# Patient Record
Sex: MALE | Race: BLACK OR AFRICAN AMERICAN | NOT HISPANIC OR LATINO
[De-identification: names, ages, dates, MRNs, and addresses within clinical notes are randomized per-mention and may not be internally consistent; named-entity substitution may affect disease eponyms.]

---

## 2017-03-24 ENCOUNTER — APPOINTMENT (OUTPATIENT)
Dept: INTERNAL MEDICINE | Facility: CLINIC | Age: 32
End: 2017-03-24

## 2017-03-24 ENCOUNTER — NON-APPOINTMENT (OUTPATIENT)
Age: 32
End: 2017-03-24

## 2017-03-24 ENCOUNTER — LABORATORY RESULT (OUTPATIENT)
Age: 32
End: 2017-03-24

## 2017-03-24 VITALS
WEIGHT: 132 LBS | SYSTOLIC BLOOD PRESSURE: 134 MMHG | RESPIRATION RATE: 12 BRPM | DIASTOLIC BLOOD PRESSURE: 92 MMHG | HEIGHT: 67 IN | HEART RATE: 76 BPM | BODY MASS INDEX: 20.72 KG/M2

## 2017-03-30 LAB
ALBUMIN MFR SERPL ELPH: 50.2 %
ALBUMIN SERPL ELPH-MCNC: 4.1 G/DL
ALBUMIN SERPL-MCNC: 4 G/DL
ALBUMIN/GLOB SERPL: 1 RATIO
ALP BLD-CCNC: 139 U/L
ALPHA1 GLOB MFR SERPL ELPH: 3.5 %
ALPHA1 GLOB SERPL ELPH-MCNC: 0.3 G/DL
ALPHA2 GLOB MFR SERPL ELPH: 6.2 %
ALPHA2 GLOB SERPL ELPH-MCNC: 0.5 G/DL
ALT SERPL-CCNC: 36 U/L
ANION GAP SERPL CALC-SCNC: 14 MMOL/L
APPEARANCE: CLEAR
AST SERPL-CCNC: 46 U/L
B-GLOBULIN MFR SERPL ELPH: 12.4 %
B-GLOBULIN SERPL ELPH-MCNC: 1 G/DL
BACTERIA: NEGATIVE
BASOPHILS # BLD AUTO: 0 K/UL
BASOPHILS NFR BLD AUTO: 0 %
BILIRUB SERPL-MCNC: 3.4 MG/DL
BILIRUBIN URINE: NEGATIVE
BLOOD URINE: NEGATIVE
BUN SERPL-MCNC: 6 MG/DL
CALCIUM SERPL-MCNC: 10 MG/DL
CHLORIDE SERPL-SCNC: 104 MMOL/L
CHOLEST SERPL-MCNC: 110 MG/DL
CHOLEST/HDLC SERPL: 4.8 RATIO
CO2 SERPL-SCNC: 22 MMOL/L
COLOR: ABNORMAL
CREAT SERPL-MCNC: 0.71 MG/DL
DEPRECATED KAPPA LC FREE/LAMBDA SER: 1.27 RATIO
EOSINOPHIL # BLD AUTO: 0 K/UL
EOSINOPHIL NFR BLD AUTO: 0 %
GAMMA GLOB FLD ELPH-MCNC: 2.2 G/DL
GAMMA GLOB MFR SERPL ELPH: 27.7 %
GLUCOSE QUALITATIVE U: NORMAL MG/DL
GLUCOSE SERPL-MCNC: 92 MG/DL
HCT VFR BLD CALC: 27.8 %
HDLC SERPL-MCNC: 23 MG/DL
HGB BLD-MCNC: 9.2 G/DL
HYALINE CASTS: 0 /LPF
IGA SER QL IEP: 365 MG/DL
IGG SER QL IEP: 2300 MG/DL
IGM SER QL IEP: 55 MG/DL
INTERPRETATION SERPL IEP-IMP: NORMAL
KAPPA LC CSF-MCNC: 2.48 MG/DL
KAPPA LC SERPL-MCNC: 3.14 MG/DL
KETONES URINE: NEGATIVE
LDLC SERPL CALC-MCNC: 62 MG/DL
LEUKOCYTE ESTERASE URINE: NEGATIVE
LYMPHOCYTES # BLD AUTO: 2.35 K/UL
LYMPHOCYTES NFR BLD AUTO: 29 %
M PROTEIN SPEC IFE-MCNC: NORMAL
MAN DIFF?: NORMAL
MCHC RBC-ENTMCNC: 25.5 PG
MCHC RBC-ENTMCNC: 33.1 GM/DL
MCV RBC AUTO: 77 FL
MICROSCOPIC-UA: NORMAL
MONOCYTES # BLD AUTO: 1.54 K/UL
MONOCYTES NFR BLD AUTO: 19 %
NEUTROPHILS # BLD AUTO: 4.14 K/UL
NEUTROPHILS NFR BLD AUTO: 51 %
NITRITE URINE: NEGATIVE
PH URINE: 6
PLATELET # BLD AUTO: 345 K/UL
POTASSIUM SERPL-SCNC: 4.6 MMOL/L
PROT SERPL-MCNC: 7.9 G/DL
PROTEIN URINE: NEGATIVE MG/DL
RBC # BLD: 3.61 M/UL
RBC # FLD: 23.5 %
RED BLOOD CELLS URINE: 2 /HPF
SODIUM SERPL-SCNC: 140 MMOL/L
SPECIFIC GRAVITY URINE: 1.01
SQUAMOUS EPITHELIAL CELLS: 0 /HPF
TRIGL SERPL-MCNC: 123 MG/DL
UROBILINOGEN URINE: 1 MG/DL
WBC # FLD AUTO: 8.11 K/UL
WHITE BLOOD CELLS URINE: 0 /HPF

## 2017-03-31 ENCOUNTER — RESULT REVIEW (OUTPATIENT)
Age: 32
End: 2017-03-31

## 2018-03-30 ENCOUNTER — NON-APPOINTMENT (OUTPATIENT)
Age: 33
End: 2018-03-30

## 2018-03-30 ENCOUNTER — APPOINTMENT (OUTPATIENT)
Dept: INTERNAL MEDICINE | Facility: CLINIC | Age: 33
End: 2018-03-30
Payer: MEDICAID

## 2018-03-30 ENCOUNTER — LABORATORY RESULT (OUTPATIENT)
Age: 33
End: 2018-03-30

## 2018-03-30 VITALS
RESPIRATION RATE: 13 BRPM | HEIGHT: 67 IN | DIASTOLIC BLOOD PRESSURE: 70 MMHG | HEART RATE: 80 BPM | BODY MASS INDEX: 20.72 KG/M2 | WEIGHT: 132 LBS | SYSTOLIC BLOOD PRESSURE: 120 MMHG

## 2018-03-30 DIAGNOSIS — Z00.00 ENCOUNTER FOR GENERAL ADULT MEDICAL EXAMINATION W/OUT ABNORMAL FINDINGS: ICD-10-CM

## 2018-03-30 DIAGNOSIS — R74.8 ABNORMAL LEVELS OF OTHER SERUM ENZYMES: ICD-10-CM

## 2018-03-30 DIAGNOSIS — D89.0 POLYCLONAL HYPERGAMMAGLOBULINEMIA: ICD-10-CM

## 2018-03-30 PROCEDURE — 86580 TB INTRADERMAL TEST: CPT

## 2018-03-30 PROCEDURE — 36415 COLL VENOUS BLD VENIPUNCTURE: CPT

## 2018-03-30 PROCEDURE — 93000 ELECTROCARDIOGRAM COMPLETE: CPT

## 2018-03-30 PROCEDURE — 99395 PREV VISIT EST AGE 18-39: CPT | Mod: 25

## 2018-04-01 LAB
ALBUMIN SERPL ELPH-MCNC: 4.3 G/DL
ALP BLD-CCNC: 171 U/L
ALT SERPL-CCNC: 45 U/L
ANION GAP SERPL CALC-SCNC: 14 MMOL/L
APPEARANCE: CLEAR
AST SERPL-CCNC: 79 U/L
BACTERIA: NEGATIVE
BASOPHILS # BLD AUTO: 0.08 K/UL
BASOPHILS NFR BLD AUTO: 0.4 %
BILIRUB SERPL-MCNC: 5.3 MG/DL
BILIRUBIN URINE: ABNORMAL
BLOOD URINE: NEGATIVE
BUN SERPL-MCNC: 10 MG/DL
CALCIUM SERPL-MCNC: 11 MG/DL
CHLORIDE SERPL-SCNC: 104 MMOL/L
CHOLEST SERPL-MCNC: 112 MG/DL
CHOLEST/HDLC SERPL: 4.3 RATIO
CO2 SERPL-SCNC: 23 MMOL/L
COLOR: ABNORMAL
CREAT SERPL-MCNC: 0.84 MG/DL
EOSINOPHIL # BLD AUTO: 0.18 K/UL
EOSINOPHIL NFR BLD AUTO: 0.9 %
GLUCOSE QUALITATIVE U: NEGATIVE MG/DL
GLUCOSE SERPL-MCNC: 93 MG/DL
HCT VFR BLD CALC: 28.7 %
HCV AB SER QL: NONREACTIVE
HCV S/CO RATIO: 0.13 S/CO
HDLC SERPL-MCNC: 26 MG/DL
HGB BLD-MCNC: 9.7 G/DL
HIV1+2 AB SPEC QL IA.RAPID: NONREACTIVE
HYALINE CASTS: 2 /LPF
IMM GRANULOCYTES NFR BLD AUTO: 0.3 %
KETONES URINE: NEGATIVE
LDLC SERPL CALC-MCNC: 60 MG/DL
LEUKOCYTE ESTERASE URINE: NEGATIVE
LYMPHOCYTES # BLD AUTO: 3.86 K/UL
LYMPHOCYTES NFR BLD AUTO: 20.3 %
MAN DIFF?: NORMAL
MCHC RBC-ENTMCNC: 27.9 PG
MCHC RBC-ENTMCNC: 33.8 GM/DL
MCV RBC AUTO: 82.5 FL
MICROSCOPIC-UA: NORMAL
MONOCYTES # BLD AUTO: 2.51 K/UL
MONOCYTES NFR BLD AUTO: 13.2 %
NEUTROPHILS # BLD AUTO: 12.3 K/UL
NEUTROPHILS NFR BLD AUTO: 64.9 %
NITRITE URINE: NEGATIVE
PH URINE: 6
PLATELET # BLD AUTO: 316 K/UL
POTASSIUM SERPL-SCNC: 4.9 MMOL/L
PROT SERPL-MCNC: 8.6 G/DL
PROTEIN URINE: NEGATIVE MG/DL
RBC # BLD: 3.48 M/UL
RBC # FLD: 25.7 %
RED BLOOD CELLS URINE: 8 /HPF
SODIUM SERPL-SCNC: 141 MMOL/L
SPECIFIC GRAVITY URINE: 1.01
SQUAMOUS EPITHELIAL CELLS: 0 /HPF
TRIGL SERPL-MCNC: 129 MG/DL
UROBILINOGEN URINE: 1 MG/DL
WBC # FLD AUTO: 18.99 K/UL
WHITE BLOOD CELLS URINE: 1 /HPF

## 2018-04-02 ENCOUNTER — RESULT REVIEW (OUTPATIENT)
Age: 33
End: 2018-04-02

## 2019-03-15 ENCOUNTER — APPOINTMENT (OUTPATIENT)
Dept: INTERNAL MEDICINE | Facility: CLINIC | Age: 34
End: 2019-03-15
Payer: MEDICAID

## 2019-03-15 VITALS — HEART RATE: 92 BPM | HEIGHT: 67 IN | BODY MASS INDEX: 20.4 KG/M2 | RESPIRATION RATE: 12 BRPM | WEIGHT: 130 LBS

## 2019-03-15 VITALS
WEIGHT: 130 LBS | SYSTOLIC BLOOD PRESSURE: 120 MMHG | TEMPERATURE: 98 F | DIASTOLIC BLOOD PRESSURE: 75 MMHG | HEART RATE: 92 BPM | BODY MASS INDEX: 20.36 KG/M2

## 2019-03-15 DIAGNOSIS — Z87.898 PERSONAL HISTORY OF OTHER SPECIFIED CONDITIONS: ICD-10-CM

## 2019-03-15 DIAGNOSIS — Z11.4 ENCOUNTER FOR SCREENING FOR HUMAN IMMUNODEFICIENCY VIRUS [HIV]: ICD-10-CM

## 2019-03-15 DIAGNOSIS — Z11.1 ENCOUNTER FOR SCREENING FOR RESPIRATORY TUBERCULOSIS: ICD-10-CM

## 2019-03-15 DIAGNOSIS — N62 HYPERTROPHY OF BREAST: ICD-10-CM

## 2019-03-15 DIAGNOSIS — Z11.59 ENCOUNTER FOR SCREENING FOR OTHER VIRAL DISEASES: ICD-10-CM

## 2019-03-15 PROCEDURE — 99213 OFFICE O/P EST LOW 20 MIN: CPT

## 2019-03-15 NOTE — ADDENDUM
[FreeTextEntry1] :  mammogram/sonogram per verbal consistent with gynecomastia\par \par PLAN\par -endocrine eval for etiology, pt aware

## 2019-03-15 NOTE — ASSESSMENT
[FreeTextEntry1] : Diagnostic LEFT mammogram/sonogram ordered and further management will be made after images. Etiologies includes possible small abscess, gynecomastia or tumor. Discussed with patient and grandmother. Patient will return to the office p.r.n./CPE\par \par \par Dr. Ulrich Was present in office building while I was examining patient

## 2019-03-15 NOTE — PHYSICAL EXAM
[No Acute Distress] : no acute distress [No Respiratory Distress] : no respiratory distress  [Clear to Auscultation] : lungs were clear to auscultation bilaterally [Normal Rate] : normal rate  [Regular Rhythm] : with a regular rhythm [Normal Affect] : the affect was normal [Normal Mood] : the mood was normal [de-identified] : +Gum ball sized palpable LEFT breast mass behind the nipple. Area is sore but no erythema or warmth. Area is slightly indurated and hard but mobile. No nipple discharge or skin retraction. No axillary lymphadenopathy

## 2019-03-15 NOTE — HISTORY OF PRESENT ILLNESS
[FreeTextEntry8] : Patient presents stating he noticed what he thought was a pimple to his left breast area about one week ago. Patient states he did not pick or poke at it and seems as though the area is getting bigger in size. Patient states it is sore to touch it and hurts even if he puts on a shirt. Patient has noticed no skin retraction or nipple discharge. Patient has had no fever

## 2019-04-03 ENCOUNTER — APPOINTMENT (OUTPATIENT)
Dept: INTERNAL MEDICINE | Facility: CLINIC | Age: 34
End: 2019-04-03

## 2019-04-30 ENCOUNTER — INPATIENT (INPATIENT)
Facility: HOSPITAL | Age: 34
LOS: 5 days | Discharge: HOME | DRG: 811 | End: 2019-05-06
Attending: INTERNAL MEDICINE | Admitting: INTERNAL MEDICINE
Payer: COMMERCIAL

## 2019-04-30 VITALS
OXYGEN SATURATION: 96 % | SYSTOLIC BLOOD PRESSURE: 137 MMHG | RESPIRATION RATE: 22 BRPM | HEART RATE: 130 BPM | DIASTOLIC BLOOD PRESSURE: 85 MMHG | WEIGHT: 130.07 LBS | HEIGHT: 67 IN | TEMPERATURE: 99 F

## 2019-04-30 DIAGNOSIS — A41.9 SEPSIS, UNSPECIFIED ORGANISM: ICD-10-CM

## 2019-04-30 DIAGNOSIS — D57.01 HB-SS DISEASE WITH ACUTE CHEST SYNDROME: ICD-10-CM

## 2019-04-30 DIAGNOSIS — J18.1 LOBAR PNEUMONIA, UNSPECIFIED ORGANISM: ICD-10-CM

## 2019-04-30 LAB
ALBUMIN SERPL ELPH-MCNC: 3.9 G/DL — SIGNIFICANT CHANGE UP (ref 3.3–5.2)
ALP SERPL-CCNC: 187 U/L — HIGH (ref 40–120)
ALT FLD-CCNC: 35 U/L — SIGNIFICANT CHANGE UP
ANION GAP SERPL CALC-SCNC: 12 MMOL/L — SIGNIFICANT CHANGE UP (ref 5–17)
APPEARANCE UR: CLEAR — SIGNIFICANT CHANGE UP
APTT BLD: 30.5 SEC — SIGNIFICANT CHANGE UP (ref 27.5–36.3)
AST SERPL-CCNC: 53 U/L — HIGH
BACTERIA # UR AUTO: ABNORMAL
BASE EXCESS BLDV CALC-SCNC: 0.6 MMOL/L — SIGNIFICANT CHANGE UP (ref -2–2)
BASOPHILS NFR BLD AUTO: 1 % — SIGNIFICANT CHANGE UP (ref 0–2)
BILIRUB SERPL-MCNC: 10.2 MG/DL — HIGH (ref 0.4–2)
BILIRUB UR-MCNC: ABNORMAL
BUN SERPL-MCNC: 5 MG/DL — LOW (ref 8–20)
CA-I SERPL-SCNC: 1.33 MMOL/L — SIGNIFICANT CHANGE UP (ref 1.15–1.33)
CALCIUM SERPL-MCNC: 10.3 MG/DL — HIGH (ref 8.6–10.2)
CHLORIDE BLDV-SCNC: 104 MMOL/L — SIGNIFICANT CHANGE UP (ref 98–107)
CHLORIDE SERPL-SCNC: 100 MMOL/L — SIGNIFICANT CHANGE UP (ref 98–107)
CO2 SERPL-SCNC: 22 MMOL/L — SIGNIFICANT CHANGE UP (ref 22–29)
COLOR SPEC: ABNORMAL
CREAT SERPL-MCNC: 0.38 MG/DL — LOW (ref 0.5–1.3)
DIFF PNL FLD: ABNORMAL
EPI CELLS # UR: SIGNIFICANT CHANGE UP
GAS PNL BLDV: 138 MMOL/L — SIGNIFICANT CHANGE UP (ref 135–145)
GAS PNL BLDV: SIGNIFICANT CHANGE UP
GAS PNL BLDV: SIGNIFICANT CHANGE UP
GLUCOSE BLDV-MCNC: 104 MG/DL — HIGH (ref 70–99)
GLUCOSE SERPL-MCNC: 104 MG/DL — SIGNIFICANT CHANGE UP (ref 70–115)
GLUCOSE UR QL: NEGATIVE MG/DL — SIGNIFICANT CHANGE UP
HCO3 BLDV-SCNC: 24 MMOL/L — SIGNIFICANT CHANGE UP (ref 20–26)
HCT VFR BLD CALC: 28.6 % — LOW (ref 42–52)
HCT VFR BLDA CALC: 31 — LOW (ref 39–50)
HGB BLD CALC-MCNC: 10.2 G/DL — LOW (ref 13–17)
HGB BLD-MCNC: 9.7 G/DL — LOW (ref 14–18)
INR BLD: 1.31 RATIO — HIGH (ref 0.88–1.16)
KETONES UR-MCNC: NEGATIVE — SIGNIFICANT CHANGE UP
LACTATE BLDV-MCNC: 1.3 MMOL/L — SIGNIFICANT CHANGE UP (ref 0.5–2)
LEUKOCYTE ESTERASE UR-ACNC: ABNORMAL
LYMPHOCYTES # BLD AUTO: 7 % — LOW (ref 20–55)
MCHC RBC-ENTMCNC: 26.7 PG — LOW (ref 27–31)
MCHC RBC-ENTMCNC: 33.9 G/DL — SIGNIFICANT CHANGE UP (ref 32–36)
MCV RBC AUTO: 78.8 FL — LOW (ref 80–94)
MONOCYTES NFR BLD AUTO: 16 % — HIGH (ref 3–10)
NEUTROPHILS NFR BLD AUTO: 75 % — HIGH (ref 37–73)
NITRITE UR-MCNC: POSITIVE
NT-PROBNP SERPL-SCNC: 84 PG/ML — SIGNIFICANT CHANGE UP (ref 0–300)
OTHER CELLS CSF MANUAL: 7 ML/DL — LOW (ref 18–22)
PCO2 BLDV: 50 MMHG — SIGNIFICANT CHANGE UP (ref 35–50)
PH BLDV: 7.34 — SIGNIFICANT CHANGE UP (ref 7.32–7.43)
PH UR: 7 — SIGNIFICANT CHANGE UP (ref 5–8)
PLATELET # BLD AUTO: 336 K/UL — SIGNIFICANT CHANGE UP (ref 150–400)
PO2 BLDV: 35 MMHG — SIGNIFICANT CHANGE UP (ref 25–45)
POTASSIUM BLDV-SCNC: 4.2 MMOL/L — SIGNIFICANT CHANGE UP (ref 3.4–4.5)
POTASSIUM SERPL-MCNC: 4.2 MMOL/L — SIGNIFICANT CHANGE UP (ref 3.5–5.3)
POTASSIUM SERPL-SCNC: 4.2 MMOL/L — SIGNIFICANT CHANGE UP (ref 3.5–5.3)
PROT SERPL-MCNC: 8.6 G/DL — SIGNIFICANT CHANGE UP (ref 6.6–8.7)
PROT UR-MCNC: 30 MG/DL
PROTHROM AB SERPL-ACNC: 15.2 SEC — HIGH (ref 10–12.9)
RAPID RVP RESULT: SIGNIFICANT CHANGE UP
RBC # BLD: 3.63 M/UL — LOW (ref 4.6–6.2)
RBC # FLD: 24.9 % — HIGH (ref 11–15.6)
RBC CASTS # UR COMP ASSIST: SIGNIFICANT CHANGE UP /HPF (ref 0–4)
SAO2 % BLDV: 54 % — SIGNIFICANT CHANGE UP
SODIUM SERPL-SCNC: 134 MMOL/L — LOW (ref 135–145)
SP GR SPEC: 1.01 — SIGNIFICANT CHANGE UP (ref 1.01–1.02)
TROPONIN T SERPL-MCNC: <0.01 NG/ML — SIGNIFICANT CHANGE UP (ref 0–0.06)
UROBILINOGEN FLD QL: 12 MG/DL
WBC # BLD: 26.8 K/UL — HIGH (ref 4.8–10.8)
WBC # FLD AUTO: 26.8 K/UL — HIGH (ref 4.8–10.8)
WBC UR QL: SIGNIFICANT CHANGE UP

## 2019-04-30 PROCEDURE — 99285 EMERGENCY DEPT VISIT HI MDM: CPT

## 2019-04-30 PROCEDURE — 71045 X-RAY EXAM CHEST 1 VIEW: CPT | Mod: 26

## 2019-04-30 PROCEDURE — 93010 ELECTROCARDIOGRAM REPORT: CPT

## 2019-04-30 PROCEDURE — 71275 CT ANGIOGRAPHY CHEST: CPT | Mod: 26

## 2019-04-30 RX ORDER — ACETAMINOPHEN WITH CODEINE 300MG-30MG
1 TABLET ORAL EVERY 4 HOURS
Qty: 0 | Refills: 0 | Status: DISCONTINUED | OUTPATIENT
Start: 2019-04-30 | End: 2019-05-06

## 2019-04-30 RX ORDER — AZITHROMYCIN 500 MG/1
500 TABLET, FILM COATED ORAL EVERY 24 HOURS
Qty: 0 | Refills: 0 | Status: DISCONTINUED | OUTPATIENT
Start: 2019-04-30 | End: 2019-05-03

## 2019-04-30 RX ORDER — CEFTRIAXONE 500 MG/1
1 INJECTION, POWDER, FOR SOLUTION INTRAMUSCULAR; INTRAVENOUS ONCE
Qty: 0 | Refills: 0 | Status: COMPLETED | OUTPATIENT
Start: 2019-04-30 | End: 2019-04-30

## 2019-04-30 RX ORDER — AZITHROMYCIN 500 MG/1
500 TABLET, FILM COATED ORAL ONCE
Qty: 0 | Refills: 0 | Status: COMPLETED | OUTPATIENT
Start: 2019-04-30 | End: 2019-04-30

## 2019-04-30 RX ORDER — ACETAMINOPHEN 500 MG
650 TABLET ORAL ONCE
Qty: 0 | Refills: 0 | Status: COMPLETED | OUTPATIENT
Start: 2019-04-30 | End: 2019-04-30

## 2019-04-30 RX ORDER — LIDOCAINE 4 G/100G
1 CREAM TOPICAL EVERY 24 HOURS
Qty: 0 | Refills: 0 | Status: DISCONTINUED | OUTPATIENT
Start: 2019-04-30 | End: 2019-05-06

## 2019-04-30 RX ORDER — CEFTRIAXONE 500 MG/1
1 INJECTION, POWDER, FOR SOLUTION INTRAMUSCULAR; INTRAVENOUS EVERY 24 HOURS
Qty: 0 | Refills: 0 | Status: DISCONTINUED | OUTPATIENT
Start: 2019-04-30 | End: 2019-05-03

## 2019-04-30 RX ORDER — SODIUM CHLORIDE 9 MG/ML
2000 INJECTION, SOLUTION INTRAVENOUS ONCE
Qty: 0 | Refills: 0 | Status: COMPLETED | OUTPATIENT
Start: 2019-04-30 | End: 2019-04-30

## 2019-04-30 RX ORDER — MORPHINE SULFATE 50 MG/1
4 CAPSULE, EXTENDED RELEASE ORAL ONCE
Qty: 0 | Refills: 0 | Status: DISCONTINUED | OUTPATIENT
Start: 2019-04-30 | End: 2019-04-30

## 2019-04-30 RX ORDER — ENOXAPARIN SODIUM 100 MG/ML
40 INJECTION SUBCUTANEOUS DAILY
Qty: 0 | Refills: 0 | Status: DISCONTINUED | OUTPATIENT
Start: 2019-04-30 | End: 2019-05-06

## 2019-04-30 RX ORDER — ACETAMINOPHEN 500 MG
650 TABLET ORAL EVERY 6 HOURS
Qty: 0 | Refills: 0 | Status: DISCONTINUED | OUTPATIENT
Start: 2019-04-30 | End: 2019-05-06

## 2019-04-30 RX ORDER — PIPERACILLIN AND TAZOBACTAM 4; .5 G/20ML; G/20ML
3.38 INJECTION, POWDER, LYOPHILIZED, FOR SOLUTION INTRAVENOUS ONCE
Qty: 0 | Refills: 0 | Status: COMPLETED | OUTPATIENT
Start: 2019-04-30 | End: 2019-04-30

## 2019-04-30 RX ORDER — MORPHINE SULFATE 50 MG/1
2 CAPSULE, EXTENDED RELEASE ORAL EVERY 6 HOURS
Qty: 0 | Refills: 0 | Status: DISCONTINUED | OUTPATIENT
Start: 2019-04-30 | End: 2019-05-06

## 2019-04-30 RX ORDER — FOLIC ACID 0.8 MG
0 TABLET ORAL
Qty: 0 | Refills: 0 | COMMUNITY

## 2019-04-30 RX ORDER — SODIUM CHLORIDE 9 MG/ML
1000 INJECTION, SOLUTION INTRAVENOUS
Qty: 0 | Refills: 0 | Status: DISCONTINUED | OUTPATIENT
Start: 2019-04-30 | End: 2019-05-01

## 2019-04-30 RX ORDER — VANCOMYCIN HCL 1 G
1000 VIAL (EA) INTRAVENOUS ONCE
Qty: 0 | Refills: 0 | Status: COMPLETED | OUTPATIENT
Start: 2019-04-30 | End: 2019-04-30

## 2019-04-30 RX ADMIN — SODIUM CHLORIDE 2000 MILLILITER(S): 9 INJECTION, SOLUTION INTRAVENOUS at 17:49

## 2019-04-30 RX ADMIN — LIDOCAINE 1 PATCH: 4 CREAM TOPICAL at 21:52

## 2019-04-30 RX ADMIN — SODIUM CHLORIDE 2000 MILLILITER(S): 9 INJECTION, SOLUTION INTRAVENOUS at 16:42

## 2019-04-30 RX ADMIN — PIPERACILLIN AND TAZOBACTAM 3.38 GRAM(S): 4; .5 INJECTION, POWDER, LYOPHILIZED, FOR SOLUTION INTRAVENOUS at 19:17

## 2019-04-30 RX ADMIN — AZITHROMYCIN 500 MILLIGRAM(S): 500 TABLET, FILM COATED ORAL at 16:45

## 2019-04-30 RX ADMIN — SODIUM CHLORIDE 125 MILLILITER(S): 9 INJECTION, SOLUTION INTRAVENOUS at 21:51

## 2019-04-30 RX ADMIN — MORPHINE SULFATE 4 MILLIGRAM(S): 50 CAPSULE, EXTENDED RELEASE ORAL at 16:49

## 2019-04-30 RX ADMIN — Medication 650 MILLIGRAM(S): at 18:17

## 2019-04-30 RX ADMIN — MORPHINE SULFATE 4 MILLIGRAM(S): 50 CAPSULE, EXTENDED RELEASE ORAL at 16:33

## 2019-04-30 RX ADMIN — Medication 250 MILLIGRAM(S): at 19:19

## 2019-04-30 RX ADMIN — Medication 1 TABLET(S): at 22:46

## 2019-04-30 RX ADMIN — CEFTRIAXONE 100 GRAM(S): 500 INJECTION, POWDER, FOR SOLUTION INTRAMUSCULAR; INTRAVENOUS at 16:45

## 2019-04-30 RX ADMIN — Medication 1 TABLET(S): at 21:51

## 2019-04-30 RX ADMIN — Medication 650 MILLIGRAM(S): at 16:44

## 2019-04-30 RX ADMIN — CEFTRIAXONE 1 GRAM(S): 500 INJECTION, POWDER, FOR SOLUTION INTRAMUSCULAR; INTRAVENOUS at 17:20

## 2019-04-30 RX ADMIN — PIPERACILLIN AND TAZOBACTAM 200 GRAM(S): 4; .5 INJECTION, POWDER, LYOPHILIZED, FOR SOLUTION INTRAVENOUS at 18:24

## 2019-04-30 NOTE — ED PROVIDER NOTE - CARE PLAN
Principal Discharge DX:	Sepsis, due to unspecified organism  Secondary Diagnosis:	Sickle cell crisis Principal Discharge DX:	Acute chest syndrome  Secondary Diagnosis:	Sickle cell crisis

## 2019-04-30 NOTE — ED ADULT TRIAGE NOTE - CHIEF COMPLAINT QUOTE
"I was sent over from [place] [facility] for chest  pain and SOB. " Pt was call in, hx of sickle cell, sent to r/o PE was sating 81% at [place] [facility] facility,

## 2019-04-30 NOTE — H&P ADULT - PROBLEM SELECTOR PLAN 1
definitive treatment is exchange transfusion=- pt and mother adamantly refuse and ask that all alternatives to this be exhausted first  will continue IVF, supplemental oxygen, pain control - pt takes Tylenol#3 at home when pain is bad, will add PRN IV MSO4 if pain severe, lidocaine patch to left chestwall/ribs, incentive spirometry to prevent further atelectasis  monitor respiratory status closely  call placed to pt's primary hematologist who's partners do come to [facility]

## 2019-04-30 NOTE — PHARMACOTHERAPY INTERVENTION NOTE - COMMENTS
Spoke to family at bedside. Patient is not on any daily pain medication.
Respiratory distress  r/o PE

## 2019-04-30 NOTE — PATIENT PROFILE ADULT - EQUIPMENT CURRENTLY USED AT HOME
Scribe Attestation (For Scribes USE Only)... no Scribe Attestation (For Scribes USE Only).../Attending Attestation (For Attendings USE Only)...

## 2019-04-30 NOTE — ED ADULT NURSE NOTE - OBJECTIVE STATEMENT
assumed pt care as critical care RN as per ER procedure. Pt c/o left sided chest pain and shortness of breath.  Had chest pain two weeks ago.  Taking motrin and codeine # 3.  Pain and SOB resolved.  Pain and shortness of breath started again 2 days ago.  hx of sickle cell.  Sent from [facility] blood center to R/O PE.  Oxygen saturation 93 on room air. Placed on oxygen 2lpm via NC for O2 sat 100% tachycardic 120

## 2019-04-30 NOTE — H&P ADULT - ATTENDING COMMENTS
Pt's mother updated at bedside, counseled on the importance of allowing for exchange transfusion, she and patient refuse at this time.

## 2019-04-30 NOTE — H&P ADULT - ASSESSMENT
35 yo male, PMHx sickle cell disease who presented with chest pain, shortness of breath, fever concerning for acute chest syndrome.     - Admit to MICU

## 2019-04-30 NOTE — ED PROVIDER NOTE - OBJECTIVE STATEMENT
33yo M hx of sickle cell pw left sided cp x 2 days. notes that had same pain 2 weeks ago took his ibuprofen and tylenol #3 improved but now pain came back with sob. cp is pleuritic.  notes when pain gets very bad gets sob. states that feels like his sickle cell crisis but worse as meds didn't help. Denies f/c/n/v//palpitations/ cough/rash/headache/dizziness/abd.pain/d/c/dysuria/hematuria. no sick contacts no recent travel. no hx of dvt/pe.

## 2019-04-30 NOTE — H&P ADULT - HISTORY OF PRESENT ILLNESS
35 yo male, PMHx sickle cell disease, who presented to ED with complaints of left-sided chest pain and shortness of breath for the past two days. Patient states he had a sickle cell crisis about two weeks prior, took 2 Ibuprofen and Tylenol/Codeine, and the crisis resolved without needing to go to the hospital. However, patient recurred two days ago and was more severe than usual prompting him to seek medical attention. Denies fever or chills. Patient states his last crisis was early April 2018, when his pain traveled from his usual back and chest pain, to his hip. 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 Upon arrival to ED, patient was found to be febrile to 101'F and tachycardic, leukocytosis WBC 26.8, retic 11.3%, lactate 1.3. Chest xray with concerns for dense LLL infiltrate vs pleural effusion. Patient was given 2L IV fluid bolus, as well as Azithromycin, Rocephin, Zosyn, and Vancomycin empirically for pneumonia.

## 2019-04-30 NOTE — ED ADULT NURSE REASSESSMENT NOTE - NS ED NURSE REASSESS COMMENT FT1
rpt received from off going RN, pt received A+Ox4, no apparent distress noted. pt remains on cardiac monitor and 2L NC. VS as noted on flow sheet. vancomycin infusing well at this time. family at bedside. pt breathing even and unlabored. MCKEON well x4. pt states "im feeling better." pt awaiting transfer to MICU at this time, pt educated on POC, verbalized understanding. pt safety measures maintained.

## 2019-04-30 NOTE — ED PROVIDER NOTE - CLINICAL SUMMARY MEDICAL DECISION MAKING FREE TEXT BOX
febrile tachy will tx as code sepsis concern for acute chest will tx with abx; labs cta to ro pe; admit

## 2019-05-01 DIAGNOSIS — D57.1 SICKLE-CELL DISEASE WITHOUT CRISIS: ICD-10-CM

## 2019-05-01 LAB
ALBUMIN SERPL ELPH-MCNC: 3.6 G/DL — SIGNIFICANT CHANGE UP (ref 3.3–5.2)
ALP SERPL-CCNC: 168 U/L — HIGH (ref 40–120)
ALT FLD-CCNC: 27 U/L — SIGNIFICANT CHANGE UP
ANION GAP SERPL CALC-SCNC: 11 MMOL/L — SIGNIFICANT CHANGE UP (ref 5–17)
AST SERPL-CCNC: 39 U/L — SIGNIFICANT CHANGE UP
BILIRUB SERPL-MCNC: 5.7 MG/DL — HIGH (ref 0.4–2)
BUN SERPL-MCNC: 4 MG/DL — LOW (ref 8–20)
CALCIUM SERPL-MCNC: 10.2 MG/DL — SIGNIFICANT CHANGE UP (ref 8.6–10.2)
CHLORIDE SERPL-SCNC: 104 MMOL/L — SIGNIFICANT CHANGE UP (ref 98–107)
CO2 SERPL-SCNC: 22 MMOL/L — SIGNIFICANT CHANGE UP (ref 22–29)
CREAT SERPL-MCNC: 0.46 MG/DL — LOW (ref 0.5–1.3)
CULTURE RESULTS: NO GROWTH — SIGNIFICANT CHANGE UP
GLUCOSE SERPL-MCNC: 110 MG/DL — SIGNIFICANT CHANGE UP (ref 70–115)
HCT VFR BLD CALC: 26.4 % — LOW (ref 42–52)
HGB BLD-MCNC: 8.8 G/DL — LOW (ref 14–18)
MCHC RBC-ENTMCNC: 26.3 PG — LOW (ref 27–31)
MCHC RBC-ENTMCNC: 33.3 G/DL — SIGNIFICANT CHANGE UP (ref 32–36)
MCV RBC AUTO: 79 FL — LOW (ref 80–94)
PLATELET # BLD AUTO: 305 K/UL — SIGNIFICANT CHANGE UP (ref 150–400)
POTASSIUM SERPL-MCNC: 4.3 MMOL/L — SIGNIFICANT CHANGE UP (ref 3.5–5.3)
POTASSIUM SERPL-SCNC: 4.3 MMOL/L — SIGNIFICANT CHANGE UP (ref 3.5–5.3)
PROCALCITONIN SERPL-MCNC: 0.32 NG/ML — HIGH (ref 0.02–0.1)
PROT SERPL-MCNC: 7.6 G/DL — SIGNIFICANT CHANGE UP (ref 6.6–8.7)
RBC # BLD: 3.34 M/UL — LOW (ref 4.6–6.2)
RBC # BLD: 3.34 M/UL — LOW (ref 4.6–6.2)
RBC # FLD: 24.3 % — HIGH (ref 11–15.6)
RETICS #: 32.2 K/UL — SIGNIFICANT CHANGE UP (ref 25–125)
RETICS/RBC NFR: 9.6 % — HIGH (ref 0.5–2.5)
SODIUM SERPL-SCNC: 137 MMOL/L — SIGNIFICANT CHANGE UP (ref 135–145)
SPECIMEN SOURCE: SIGNIFICANT CHANGE UP
WBC # BLD: 24.5 K/UL — HIGH (ref 4.8–10.8)
WBC # FLD AUTO: 24.5 K/UL — HIGH (ref 4.8–10.8)

## 2019-05-01 PROCEDURE — 99233 SBSQ HOSP IP/OBS HIGH 50: CPT

## 2019-05-01 PROCEDURE — 99232 SBSQ HOSP IP/OBS MODERATE 35: CPT

## 2019-05-01 RX ORDER — HYDROXYUREA 500 MG/1
500 CAPSULE ORAL
Qty: 0 | Refills: 0 | Status: DISCONTINUED | OUTPATIENT
Start: 2019-05-01 | End: 2019-05-06

## 2019-05-01 RX ORDER — SODIUM CHLORIDE 9 MG/ML
1000 INJECTION, SOLUTION INTRAVENOUS
Qty: 0 | Refills: 0 | Status: DISCONTINUED | OUTPATIENT
Start: 2019-05-01 | End: 2019-05-03

## 2019-05-01 RX ORDER — FOLIC ACID 0.8 MG
1 TABLET ORAL DAILY
Qty: 0 | Refills: 0 | Status: DISCONTINUED | OUTPATIENT
Start: 2019-05-01 | End: 2019-05-06

## 2019-05-01 RX ORDER — MORPHINE SULFATE 50 MG/1
2 CAPSULE, EXTENDED RELEASE ORAL ONCE
Qty: 0 | Refills: 0 | Status: DISCONTINUED | OUTPATIENT
Start: 2019-05-01 | End: 2019-05-06

## 2019-05-01 RX ADMIN — AZITHROMYCIN 255 MILLIGRAM(S): 500 TABLET, FILM COATED ORAL at 17:48

## 2019-05-01 RX ADMIN — Medication 650 MILLIGRAM(S): at 22:20

## 2019-05-01 RX ADMIN — HYDROXYUREA 500 MILLIGRAM(S): 500 CAPSULE ORAL at 19:04

## 2019-05-01 RX ADMIN — MORPHINE SULFATE 2 MILLIGRAM(S): 50 CAPSULE, EXTENDED RELEASE ORAL at 06:38

## 2019-05-01 RX ADMIN — Medication 1 TABLET(S): at 21:10

## 2019-05-01 RX ADMIN — MORPHINE SULFATE 2 MILLIGRAM(S): 50 CAPSULE, EXTENDED RELEASE ORAL at 17:48

## 2019-05-01 RX ADMIN — MORPHINE SULFATE 2 MILLIGRAM(S): 50 CAPSULE, EXTENDED RELEASE ORAL at 18:15

## 2019-05-01 RX ADMIN — Medication 1 TABLET(S): at 11:28

## 2019-05-01 RX ADMIN — LIDOCAINE 1 PATCH: 4 CREAM TOPICAL at 09:30

## 2019-05-01 RX ADMIN — Medication 1 TABLET(S): at 12:00

## 2019-05-01 RX ADMIN — LIDOCAINE 1 PATCH: 4 CREAM TOPICAL at 07:50

## 2019-05-01 RX ADMIN — Medication 1 TABLET(S): at 20:15

## 2019-05-01 RX ADMIN — ENOXAPARIN SODIUM 40 MILLIGRAM(S): 100 INJECTION SUBCUTANEOUS at 12:00

## 2019-05-01 RX ADMIN — SODIUM CHLORIDE 125 MILLILITER(S): 9 INJECTION, SOLUTION INTRAVENOUS at 15:22

## 2019-05-01 RX ADMIN — Medication 1 TABLET(S): at 10:43

## 2019-05-01 RX ADMIN — CEFTRIAXONE 100 GRAM(S): 500 INJECTION, POWDER, FOR SOLUTION INTRAMUSCULAR; INTRAVENOUS at 20:14

## 2019-05-01 RX ADMIN — SODIUM CHLORIDE 125 MILLILITER(S): 9 INJECTION, SOLUTION INTRAVENOUS at 07:46

## 2019-05-01 RX ADMIN — LIDOCAINE 1 PATCH: 4 CREAM TOPICAL at 22:11

## 2019-05-01 RX ADMIN — Medication 650 MILLIGRAM(S): at 21:50

## 2019-05-01 RX ADMIN — Medication 1 MILLIGRAM(S): at 12:00

## 2019-05-01 RX ADMIN — MORPHINE SULFATE 2 MILLIGRAM(S): 50 CAPSULE, EXTENDED RELEASE ORAL at 07:15

## 2019-05-01 NOTE — PROGRESS NOTE ADULT - PROBLEM SELECTOR PLAN 2
- continue folic acid, MVI, hydroxyurea  - If patient deteriorates, would consider exchange transfusion, however, patient's mother adamantly opposed, issue would have to be discussed with patient. Currently patient is improving and exchange transfusion not indicated  -Daily LDH and retic count - continue folic acid, MVI, hydroxyurea  - If patient deteriorates, would consider exchange transfusion, however, patient's mother adamantly opposed, issue would have to be discussed with patient. Currently patient is improving and exchange transfusion not indicated  -Daily LDH and retic count  - Endocrine input appreciated

## 2019-05-01 NOTE — PROGRESS NOTE ADULT - SUBJECTIVE AND OBJECTIVE BOX
Patient is a 34y old  Male who presents with a chief complaint of Acute Chest Syndrome (2019 18:30)    PAST MEDICAL & SURGICAL HISTORY:  Perthes disease, left  Sickle cell anemia  No significant past surgical history    STEVIE DEAL   34y    Male    BRIEF HOSPITAL COURSE:    "33 yo male, PMHx sickle cell disease, who presented to ED with complaints of left-sided chest pain and shortness of breath for the past two days. Patient states he had a sickle cell crisis about two weeks prior, took 2 Ibuprofen and Tylenol/Codeine, and the crisis resolved without needing to go to the hospital. However, patient recurred two days ago and was more severe than usual prompting him to seek medical attention. Denies fever or chills. Patient states his last crisis was early 2018, when his pain traveled from his usual back and chest pain, to his hip. 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 Upon arrival to ED, patient was found to be febrile to 101'F and tachycardic, leukocytosis WBC 26.8, retic 11.3%, lactate 1.3. Chest xray with concerns for dense LLL infiltrate vs pleural effusion. Patient was given 2L IV fluid bolus, as well as Azithromycin, Rocephin, Zosyn, and Vancomycin empirically for pneumonia."        Review of Systems:   L sided Chest pain improved                    All other ROS are negative.    Allergies    No Known Allergies    Intolerances      Weight (kg): 59 (19 @ 15:54)  BMI (kg/m2): 20.4 (19 @ 15:54)    ICU Vital Signs Last 24 Hrs  T(C): 37.7 (2019 23:23), Max: 38.3 (2019 16:33)  T(F): 99.8 (2019 23:23), Max: 101 (2019 16:33)  HR: 117 (01 May 2019 02:00) (108 - 130)  BP: 118/75 (01 May 2019 02:00) (110/71 - 137/85)  BP(mean): 91 (01 May 2019 02:00) (85 - 106)  ABP: --  ABP(mean): --  RR: 25 (01 May 2019 02:00) (20 - 29)  SpO2: 97% (01 May 2019 02:00) (96% - 100%)    Physical Examination:    General: mild tachypnea     HEENT: no JVD  icteric sclera     PULM: consolidation sounds L > R    CVS: s1 s2 reg    ABD: soft    EXT: no edema     SKIN: warm    Neuro: alert NF          LABS:                        9.7    26.8  )-----------( 336      ( 2019 16:31 )             28.6         134<L>  |  100  |  5.0<L>  ----------------------------<  104  4.2   |  22.0  |  0.38<L>    Ca    10.3<H>      2019 16:31    TPro  8.6  /  Alb  3.9  /  TBili  10.2<H>  /  DBili  x   /  AST  53<H>  /  ALT  35  /  AlkPhos  187<H>        CARDIAC MARKERS ( 2019 16:31 )  x     / <0.01 ng/mL / 24 U/L / x     / x          CAPILLARY BLOOD GLUCOSE        PT/INR - ( 2019 16:31 )   PT: 15.2 sec;   INR: 1.31 ratio         PTT - ( 2019 16:31 )  PTT:30.5 sec  Urinalysis Basic - ( 2019 17:37 )    Color: Delores / Appearance: Clear / S.010 / pH: x  Gluc: x / Ketone: Negative  / Bili: Moderate / Urobili: 12 mg/dL   Blood: x / Protein: 30 mg/dL / Nitrite: Positive   Leuk Esterase: Trace / RBC: 0-2 /HPF / WBC 0-2   Sq Epi: x / Non Sq Epi: Occasional / Bacteria: Few      CULTURES:  Rapid RVP Result: Scarlettec ( @ 19:49)      Medications:  MEDICATIONS  (STANDING):  azithromycin  IVPB 500 milliGRAM(s) IV Intermittent every 24 hours  cefTRIAXone   IVPB 1 Gram(s) IV Intermittent every 24 hours  enoxaparin Injectable 40 milliGRAM(s) SubCutaneous daily  lidocaine   Patch 1 Patch Transdermal every 24 hours  multiple electrolytes Injection Type 1 1000 milliLiter(s) (125 mL/Hr) IV Continuous <Continuous>    MEDICATIONS  (PRN):  acetaminophen   Tablet .. 650 milliGRAM(s) Oral every 6 hours PRN Temp greater or equal to 38C (100.4F)  acetaminophen 300 mG/codeine 30 mG 1 Tablet(s) Oral every 4 hours PRN Moderate Pain (4 - 6)  morphine  - Injectable 2 milliGRAM(s) IV Push every 6 hours PRN Severe Pain (7 - 10)         @ 07:01  -  05-01 @ 02:38  --------------------------------------------------------  IN: 750 mL / OUT: 1900 mL / NET: -1150 mL        RADIOLOGY/IMAGING/ECHO    < from: CT Angio Chest w/ IV Cont (19 @ 18:13) >  MPRESSION:     No central pulmonary embolism.    Large consolidation in the left lower lobe and lingula and smaller   consolidations in the right lower and middle lobes with the differential   including acute chest syndrome, however correlation is also recommended   for pneumonia.    < end of copied text >    Critical care point of care ultrasound:    Assessment/Plan:    34M SCD bilateral infiltrates on CT L sided CP    PNA vs acute chest syndrome (SSC) favor the latter.  No cough rigors at home.  Fever here            Hydrate/ ABX to cover atypical and encapsulated GNR  check PCT/ viral panel negative/pain management/incentive spirometry     He has an exchange transfusion as a child that "went wrong" according to the mother.    Told her  that if he worsens, may need xch transfusion. Her reply,  "over my dead body"  Patient in agreement,  feels he is getting better.      DVT P     Hematology called to see  ?? why not on hydroxyurea.     Add folate/MVI       CRITICAL CARE TIME SPENT: 37 minutes assessing presenting problems of acute illness, which pose high probability of life threatening deterioration or end organ damage/dysfunction, as well as medical decision making including initiating plan of care, reviewing data, reviewing radiologic exams, discussing with multidisciplinary team,  discussing goals of care with patient/family, and writing this note.  Non-inclusive of procedures performed,

## 2019-05-01 NOTE — PROGRESS NOTE ADULT - SUBJECTIVE AND OBJECTIVE BOX
Patient is a 34y old  Male who presents with a chief complaint of Acute Chest Syndrome (01 May 2019 08:50)      BRIEF HOSPITAL COURSE:   35 yo male, PMHx sickle cell disease, who presented to ED with complaints of left-sided chest pain and shortness of breath for the past two days. Patient states he had a sickle cell crisis about two weeks prior, took 2 Ibuprofen and Tylenol/Codeine, and the crisis resolved without needing to go to the hospital. However, patient recurred two days ago and was more severe than usual prompting him to seek medical attention. Denies fever or chills. Patient states his last crisis was early 2018, when his pain traveled from his usual back and chest pain, to his hip. He was admitted to  for workup, MRI revealed necrosis of hip with residual damage, however he was evaluated by a hip specialist and was told he did not require surgery because he maintained full mobility of his hip. Upon arrival to ED, patient was found to be febrile to 101'F and tachycardic, leukocytosis WBC 26.8, retic 11.3%, lactate 1.3. Chest xray with concerns for dense LLL infiltrate vs pleural effusion. Patient was given 2L IV fluid bolus, as well as Azithromycin, Rocephin, Zosyn, and Vancomycin empirically for pneumonia."      Events last 24 hours:     Patient valuated this morning, reports pain is 5/10 ar rest and 8-9/10 when he moves around in the bed. Complains of pain in left side of chest pain, improved since yesterday. Denies abdominal pain, urinary symptoms.     PAST MEDICAL & SURGICAL HISTORY:  Perthes disease, left  Sickle cell anemia  No significant past surgical history    Review of Systems:  CONSTITUTIONAL: No fever, chills, or fatigue  EYES: No eye pain, visual disturbances, or discharge  ENMT:  No difficulty hearing, tinnitus, vertigo; No sinus or throat pain  NECK: No pain or stiffness  RESPIRATORY: No cough, wheezing, chills or hemoptysis; No shortness of breath  CARDIOVASCULAR: No chest pain, palpitations, dizziness, or leg swelling  GASTROINTESTINAL: No abdominal or epigastric pain. No nausea, vomiting, or hematemesis; No diarrhea or constipation. No melena or hematochezia.  GENITOURINARY: No dysuria, frequency, hematuria, or incontinence  NEUROLOGICAL: No headaches, memory loss, loss of strength, numbness, or tremors  SKIN: No itching, burning, rashes, or lesions   MUSCULOSKELETAL: No joint pain or swelling; No muscle, back, or extremity pain  PSYCHIATRIC: No depression, anxiety, mood swings, or difficulty sleeping      Medications:  azithromycin  IVPB 500 milliGRAM(s) IV Intermittent every 24 hours  cefTRIAXone   IVPB 1 Gram(s) IV Intermittent every 24 hours    acetaminophen   Tablet .. 650 milliGRAM(s) Oral every 6 hours PRN  acetaminophen 300 mG/codeine 30 mG 1 Tablet(s) Oral every 4 hours PRN  morphine  - Injectable 2 milliGRAM(s) IV Push every 6 hours PRN    enoxaparin Injectable 40 milliGRAM(s) SubCutaneous daily  folic acid 1 milliGRAM(s) Oral daily  multiple electrolytes Injection Type 1 1000 milliLiter(s) IV Continuous <Continuous>  multivitamin 1 Tablet(s) Oral daily  lidocaine   Patch 1 Patch Transdermal every 24 hours    ICU Vital Signs Last 24 Hrs  T(C): 37.7 (01 May 2019 07:32), Max: 38.3 (2019 16:33)  T(F): 99.8 (01 May 2019 07:32), Max: 101 (2019 16:33)  HR: 112 (01 May 2019 08:00) (106 - 130)  BP: 115/73 (01 May 2019 08:00) (109/69 - 137/85)  BP(mean): 89 (01 May 2019 08:00) (82 - 106)  RR: 31 (01 May 2019 08:00) (18 - 31)  SpO2: 95% (01 May 2019 08:00) (95% - 100%)    I&O's Detail    2019 07:  -  01 May 2019 07:00  --------------------------------------------------------  IN:  multiple electrolytes Injection Type 1multiple electrolytes Injection Type 1: 750 mL  Total IN: 750 mL    OUT:    Voided: 2450 mL  Total OUT: 2450 mL    Total NET: -1700 mL      01 May 2019 07:01  -  01 May 2019 09:07  --------------------------------------------------------  IN:    multiple electrolytes Injection Type 1: 250 mL  Total IN: 250 mL    OUT:    Voided: 450 mL  Total OUT: 450 mL    Total NET: -200 mL            LABS:                        8.8    24.5  )-----------( 305      ( 01 May 2019 05:11 )             26.4     05    137  |  104  |  4.0<L>  ----------------------------<  110  4.3   |  22.0  |  0.46<L>    Ca    10.2      01 May 2019 05:11    TPro  7.6  /  Alb  3.6  /  TBili  5.7<H>  /  DBili  x   /  AST  39  /  ALT  27  /  AlkPhos  168<H>  05      CARDIAC MARKERS ( 2019 16:31 )  x     / <0.01 ng/mL / 24 U/L / x     / x          CAPILLARY BLOOD GLUCOSE        PT/INR - ( 2019 16:31 )   PT: 15.2 sec;   INR: 1.31 ratio         PTT - ( 2019 16:31 )  PTT:30.5 sec  Urinalysis Basic - ( 2019 17:37 )    Color: Delores / Appearance: Clear / S.010 / pH: x  Gluc: x / Ketone: Negative  / Bili: Moderate / Urobili: 12 mg/dL   Blood: x / Protein: 30 mg/dL / Nitrite: Positive   Leuk Esterase: Trace / RBC: 0-2 /HPF / WBC 0-2   Sq Epi: x / Non Sq Epi: Occasional / Bacteria: Few      CULTURES:  Rapid RVP Result: NotDetec ( @ 19:49)      Physical Examination:    General: No acute distress.      HEENT: Pupils equal, reactive to light.  Symmetric.    PULM: Clear to auscultation bilaterally, no significant sputum production    NECK: Supple, no lymphadenopathy, trachea midline    CVS: Regular rate and rhythm, no murmurs, rubs, or gallops    ABD: Soft, nondistended, nontender, normoactive bowel sounds, no masses    EXT: No edema, nontender    SKIN: Warm and well perfused, no rashes noted.    NEURO: Alert, oriented, interactive, nonfocal    DEVICES:     RADIOLOGY: ***    CRITICAL CARE TIME SPENT: *** Patient is a 34y old  Male who presents with a chief complaint of Acute Chest Syndrome (01 May 2019 08:50)    BRIEF HOSPITAL COURSE:   35 yo male, PMHx sickle cell disease, who presented to ED with complaints of left-sided chest pain and shortness of breath for the past two days. Patient states he had a sickle cell crisis about two weeks prior, took 2 Ibuprofen and Tylenol/Codeine, and the crisis resolved without needing to go to the hospital. However, patient recurred two days ago and was more severe than usual prompting him to seek medical attention. Denies fever or chills. Patient states his last crisis was early 2018, when his pain traveled from his usual back and chest pain, to his hip. 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 Upon arrival to ED, patient was found to be febrile to 101'F and tachycardic, leukocytosis WBC 26.8, retic 11.3%, lactate 1.3. Chest xray with concerns for dense LLL infiltrate vs pleural effusion. Patient was given 2L IV fluid bolus, as well as Azithromycin, Rocephin, Zosyn, and Vancomycin empirically for pneumonia."      Events last 24 hours: Patient has been on supplemental oxygen by nasal cannula, receiving IV fluids and morphine for pain control, as well as empiric antibiotics for presumed ACS.     Patient valuated this morning, reports pain is 5/10 ar rest and 8-9/10 when he moves around in the bed. Complains of pain in left side of chest pain, improved since yesterday. Denies fever, chills, night sweats, abdominal pain, urinary symptoms.    PAST MEDICAL & SURGICAL HISTORY:  Perthes disease, left  Sickle cell anemia  No significant past surgical history    Review of Systems:  CONSTITUTIONAL: No fever, chills, or fatigue  EYES: No eye pain, visual disturbances, or discharge  ENMT:  No difficulty hearing, tinnitus, vertigo; No sinus or throat pain  NECK: No pain or stiffness  RESPIRATORY: No cough, wheezing, chills or hemoptysis; No shortness of breath  CARDIOVASCULAR: + L sided chest pain, No palpitations, dizziness, or leg swelling  GASTROINTESTINAL: No abdominal or epigastric pain. No nausea, vomiting, or hematemesis; No diarrhea or constipation. No melena or hematochezia.  GENITOURINARY: No dysuria, frequency, hematuria, or incontinence  NEUROLOGICAL: No headaches, memory loss, loss of strength, numbness, or tremors  SKIN: No itching, burning, rashes, or lesions   MUSCULOSKELETAL: No joint pain or swelling; No muscle, back, or extremity pain  PSYCHIATRIC: No depression, anxiety, mood swings, or difficulty sleeping      Medications:  azithromycin  IVPB 500 milliGRAM(s) IV Intermittent every 24 hours  cefTRIAXone   IVPB 1 Gram(s) IV Intermittent every 24 hours    acetaminophen   Tablet .. 650 milliGRAM(s) Oral every 6 hours PRN  acetaminophen 300 mG/codeine 30 mG 1 Tablet(s) Oral every 4 hours PRN  morphine  - Injectable 2 milliGRAM(s) IV Push every 6 hours PRN    enoxaparin Injectable 40 milliGRAM(s) SubCutaneous daily  folic acid 1 milliGRAM(s) Oral daily  multiple electrolytes Injection Type 1 1000 milliLiter(s) IV Continuous <Continuous>  multivitamin 1 Tablet(s) Oral daily  lidocaine   Patch 1 Patch Transdermal every 24 hours    ICU Vital Signs Last 24 Hrs  T(C): 37.7 (01 May 2019 07:32), Max: 38.3 (2019 16:33)  T(F): 99.8 (01 May 2019 07:32), Max: 101 (2019 16:33)  HR: 112 (01 May 2019 08:00) (106 - 130)  BP: 115/73 (01 May 2019 08:00) (109/69 - 137/85)  BP(mean): 89 (01 May 2019 08:00) (82 - 106)  RR: 31 (01 May 2019 08:00) (18 - 31)  SpO2: 95% (01 May 2019 08:00) (95% - 100%)    I&O's Detail    2019 07:01  -  01 May 2019 07:00  --------------------------------------------------------  IN:  multiple electrolytes Injection Type 1multiple electrolytes Injection Type 1: 750 mL  Total IN: 750 mL    OUT:    Voided: 2450 mL  Total OUT: 2450 mL    Total NET: -1700 mL      01 May 2019 07:01  -  01 May 2019 09:07  --------------------------------------------------------  IN:    multiple electrolytes Injection Type 1: 250 mL  Total IN: 250 mL    OUT:    Voided: 450 mL  Total OUT: 450 mL    Total NET: -200 mL    LABS:                        8.8    24.5  )-----------( 305      ( 01 May 2019 05:11 )             26.4     05    137  |  104  |  4.0<L>  ----------------------------<  110  4.3   |  22.0  |  0.46<L>    Ca    10.2      01 May 2019 05:11    TPro  7.6  /  Alb  3.6  /  TBili  5.7<H>  /  DBili  x   /  AST  39  /  ALT  27  /  AlkPhos  168<H>  05      CARDIAC MARKERS ( 2019 16:31 )  x     / <0.01 ng/mL / 24 U/L / x     / x        CAPILLARY BLOOD GLUCOSE    PT/INR - ( 2019 16:31 )   PT: 15.2 sec;   INR: 1.31 ratio    PTT - ( 2019 16:31 )  PTT:30.5 sec  Urinalysis Basic - ( 2019 17:37 )    Color: Delores / Appearance: Clear / S.010 / pH: x  Gluc: x / Ketone: Negative  / Bili: Moderate / Urobili: 12 mg/dL   Blood: x / Protein: 30 mg/dL / Nitrite: Positive   Leuk Esterase: Trace / RBC: 0-2 /HPF / WBC 0-2   Sq Epi: x / Non Sq Epi: Occasional / Bacteria: Few      CULTURES:  Rapid RVP Result: Scarlettec ( @ 19:49)  Urine Culture Pending  Blood cultures pending    Physical Examination:    General: No acute distress.       HEENT: Icteric, Pupils equal, reactive to light. Symmetric.    PULM: Moderate respiratory effort 2/2 pain, Occasional expiratory rales in left lung, no significant sputum production    NECK: Supple, no lymphadenopathy, trachea midline    CVS: Regular rate and rhythm, no murmurs, rubs, or gallops    ABD: Soft, nondistended, nontender, normoactive bowel sounds, no masses    EXT: No edema, nontender    SKIN: Warm and well perfused, no rashes noted.    NEURO: Alert, oriented, interactive, nonfocal    RADIOLOGY:   < from: CT Angio Chest w/ IV Cont (19 @ 18:13) >  No central pulmonary embolism.    Large consolidation in the left lower lobe and lingula and smaller   consolidations in the right lower and middle lobes with the differential   including acute chest syndrome, however correlation is also recommended   for pneumonia.    < end of copied text >      CRITICAL CARE TIME SPENT: ***

## 2019-05-01 NOTE — CONSULT NOTE ADULT - ASSESSMENT
Sickle cell crisis recently complicated by left femoral avascular necrosis in 4/2019,   A/w fever, SOB, chest pain  CT finding concerning for acute chest syndrome    IVF, antibiotics, supplement oxygen, analgesic  Would consider Exchange transfusion if clinical deterioration  Start hydrea 500 mg BID in attempt to decrease Hbg S production  Trend daily LDH and monitor retic  Will follow. Please call if any question [phone] Sickle cell crisis with history of avascular necrosis of left hip in 4/2018   A/w fever, SOB, chest pain  CT finding concerning for acute chest syndrome    -IVF, antibiotics, supplement oxygen, analgesic  -discussed about Exchange transfusion if clinical deterioration. Patient's mother stated that patient had severe complication with exchange transfusion when he was 1 and 1/2 year old. We then discussed about cross matched prbc transfusion. Discussed risk and benefit. They want to hold off for now.  -Start hydrea 500 mg BID in attempt to decrease Hbg S production. Discussed with patient and his mother at bedside including risk and benefit.  -trend LDH and retic  -discussed with patient's mother at bedside, ICU team and Dr. Cramer  -Will follow. Please call if any question [phone]

## 2019-05-01 NOTE — PROGRESS NOTE ADULT - ATTENDING COMMENTS
33 yo male with sickle cell disease, presented with pneumonia, acute chest syndrome. This morning patient is hemodynamically stable, feels well, on minimal oxygen.  Continue abx, fluids, encouraged to do incentive spirometry as he seems to be splinting.  Can downgrade from MICU.

## 2019-05-01 NOTE — PROGRESS NOTE ADULT - ASSESSMENT
Pt is a 33 yo M who presented to the ED w/ dyspnea and L sided chest pain found to have b/l infiltrates concerning for Acute chest syndrome and underlying sickle cell crisis. No other PMH     Possible acute chest syndrome: clinically improved.   -continue to monitor Hgb and retic count and LDH as recommended by hematology.   -continue IVF  -patient does not wish to start hydroxyurea as he states he would like to discuss with his hematologist. He cannot provide the contact information and is unsure of the name or exactly where his hematologist is location. Patient is going to try to obtain more information about his hematologist from his mother and then can re-evaluate if he is agreeable to starting hydroxyurea.   -continue analgesia, oxygen, rocephin and azithromycin    Sickle cell crisis: see above.   -hematology rec's appreciated.     DVT ppx: lovenox

## 2019-05-01 NOTE — PROGRESS NOTE ADULT - ASSESSMENT
33 yo male, PMHx sickle cell disease, who presented to ED with complaints of left-sided chest pain and shortness of breath for the past two days found to have bilateral pulmonary infiltrates on imaging likely due to Acute Chest Syndrome. Patient improving at this time, does not require ICU level of care and will be downgraded to medical floor.

## 2019-05-01 NOTE — PROGRESS NOTE ADULT - PROBLEM SELECTOR PLAN 1
- c/w IV fluids  - Pain control  - c/w antibiotics: S/p vancomycin and zosyn, c/w Ceftriaxone, Azithromycin (Antibiotic End date: 5/6/2019 ) - c/w IV fluids  - Pain control  - Supplemental oxygen maintain spO2>94%  - c/w antibiotics: S/p vancomycin and zosyn, c/w Ceftriaxone, Azithromycin (Antibiotic End date: 5/6/2019 )

## 2019-05-01 NOTE — CONSULT NOTE ADULT - SUBJECTIVE AND OBJECTIVE BOX
Patient is a 34y old  Male who presents with a chief complaint of Acute Chest Syndrome    HPI:  33 yo male, PMHx sickle cell disease, who presented to ED with complaints of left-sided chest pain and shortness of breath for the past two days. Patient states he had a sickle cell crisis about two weeks prior, took 2 Ibuprofen and Tylenol/Codeine, and the crisis resolved without needing to go to the hospital. However, patient recurred two days ago and was more severe than usual prompting him to seek medical attention. Denies fever or chills. Patient states his last crisis was early April 2018, when his pain traveled from his usual back and chest pain, to his hip. 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 Upon arrival to ED, patient was found to be febrile to 101'F and tachycardic, leukocytosis WBC 26.8, retic 11.3%, lactate 1.3. Chest xray with concerns for dense LLL infiltrate vs pleural effusion. Patient was given 2L IV fluid bolus, as well as Azithromycin, Rocephin, Zosyn, and Vancomycin empirically for pneumonia.      ROS:  Negative except for:    PAST MEDICAL & SURGICAL HISTORY:  Perthes disease, left  Sickle cell anemia  No significant past surgical history      SOCIAL HISTORY:    FAMILY HISTORY:      MEDICATIONS  (STANDING):  azithromycin  IVPB 500 milliGRAM(s) IV Intermittent every 24 hours  cefTRIAXone   IVPB 1 Gram(s) IV Intermittent every 24 hours  enoxaparin Injectable 40 milliGRAM(s) SubCutaneous daily  folic acid 1 milliGRAM(s) Oral daily  lidocaine   Patch 1 Patch Transdermal every 24 hours  multiple electrolytes Injection Type 1 1000 milliLiter(s) (125 mL/Hr) IV Continuous <Continuous>  multivitamin 1 Tablet(s) Oral daily    MEDICATIONS  (PRN):  acetaminophen   Tablet .. 650 milliGRAM(s) Oral every 6 hours PRN Temp greater or equal to 38C (100.4F)  acetaminophen 300 mG/codeine 30 mG 1 Tablet(s) Oral every 4 hours PRN Moderate Pain (4 - 6)  morphine  - Injectable 2 milliGRAM(s) IV Push every 6 hours PRN Severe Pain (7 - 10)      Allergies    No Known Allergies    Intolerances        Vital Signs Last 24 Hrs  T(C): 37.7 (01 May 2019 07:32), Max: 38.3 (30 Apr 2019 16:33)  T(F): 99.8 (01 May 2019 07:32), Max: 101 (30 Apr 2019 16:33)  HR: 112 (01 May 2019 08:00) (106 - 130)  BP: 115/73 (01 May 2019 08:00) (109/69 - 137/85)  BP(mean): 89 (01 May 2019 08:00) (82 - 106)  RR: 31 (01 May 2019 08:00) (18 - 31)  SpO2: 95% (01 May 2019 08:00) (95% - 100%)    PHYSICAL EXAM  General: adult in NAD  HEENT: clear oropharynx, anicteric sclera, pink conjunctiva  Neck: supple  CV: normal S1/S2 with no murmur rubs or gallops  Lungs: positive air movement b/l ant lungs,clear to auscultation, no wheezes, no rales  Abdomen: soft non-tender non-distended, no hepatosplenomegaly  Ext: no clubbing cyanosis or edema  Skin: no rashes and no petechiae  Neuro: alert and oriented X 4, no focal deficits      LABS:                          8.8    24.5  )-----------( 305      ( 01 May 2019 05:11 )             26.4         Mean Cell Volume : 79.0 fl  Mean Cell Hemoglobin : 26.3 pg  Mean Cell Hemoglobin Concentration : 33.3 g/dL  Auto Neutrophil # : x  Auto Lymphocyte # : x  Auto Monocyte # : x  Auto Eosinophil # : x  Auto Basophil # : x  Auto Neutrophil % : x  Auto Lymphocyte % : x  Auto Monocyte % : x  Auto Eosinophil % : x  Auto Basophil % : x      Serial CBC's  05-01 @ 05:11  Hct-26.4 / Hgb-8.8 / Plat-305 / RBC-3.34 / WBC-24.5  Serial CBC's  04-30 @ 16:31  Hct-28.6 / Hgb-9.7 / Plat-336 / RBC-3.63 / WBC-26.8      05-01    137  |  104  |  4.0<L>  ----------------------------<  110  4.3   |  22.0  |  0.46<L>    Ca    10.2      01 May 2019 05:11    TPro  7.6  /  Alb  3.6  /  TBili  5.7<H>  /  DBili  x   /  AST  39  /  ALT  27  /  AlkPhos  168<H>  05-01      PT/INR - ( 30 Apr 2019 16:31 )   PT: 15.2 sec;   INR: 1.31 ratio         PTT - ( 30 Apr 2019 16:31 )  PTT:30.5 sec    Reticulocyte Percent: 9.6 % (05-01-19 @ 05:11)  Reticulocyte Percent: 11.3 % (04-30-19 @ 16:31)              RADIOLOGY & ADDITIONAL STUDIES:  EXAM:  CT ANGIO CHEST (W)AW IC                          PROCEDURE DATE:  04/30/2019          INTERPRETATION:  CLINICAL INFORMATION: Pleuritic chest pain. History of   sickle cell disease. Concern for acute chest syndrome. Evaluate for PE.    COMPARISON: Chest x-ray 4/30/2019    PROCEDURE:   CT Angiography of the Chest.  93 ml of Omnipaque 350 was injected intravenously.   Sagittal and coronal reformats were performed as well as 3D (MIP)   reconstructions.      FINDINGS:    CHEST:     LUNGS AND LARGE AIRWAYS: Patent central airways.  Large consolidation   involving the inferior aspect of the left lower lobe and lingula and   small consolidations in the right lower lobe and right middle lobe.  PLEURA: Small left pleural effusion.  VESSELS: Evaluation of the pulmonary arteries is limited secondary to the   timing of the contrast bolus. There is no main or lobar pulmonary   embolism.  HEART: Heart size is normal. No pericardial effusion.  MEDIASTINUM AND JALEN: No lymphadenopathy.  CHEST WALL AND LOWER NECK: Within normal limits.  VISUALIZED UPPER ABDOMEN: There is cholelithiasis. There is a calcified   structure at the posterior aspect of the left upper quadrant measuring   3.6 cm, likely atrophic/infarcted spleen.  BONES: A few H shaped vertebral bodies are noted consistent with the   history of sickle cell disease.    IMPRESSION:     No central pulmonary embolism.    Large consolidation in the left lower lobe and lingula and smaller   consolidations in the right lower and middle lobes with the differential   including acute chest syndrome, however correlation is also recommended   for pneumonia. Patient is a 34y old  Male who presents with a chief complaint of Acute Chest Syndrome    HPI:  33 yo male, PMHx sickle cell disease, who presented to ED with complaints of left-sided chest pain and shortness of breath for the past two days. Patient states he had a sickle cell crisis about two weeks prior, took 2 Ibuprofen and Tylenol/Codeine, and the crisis resolved without needing to go to the hospital. However, patient recurred two days ago and was more severe than usual prompting him to seek medical attention. Denies fever or chills. Patient states his last crisis was early April 2018, when his pain traveled from his usual back and chest pain, to his hip. 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 Upon arrival to ED, patient was found to be febrile to 101'F and tachycardic, leukocytosis WBC 26.8, retic 11.3%, lactate 1.3. Chest xray with concerns for dense LLL infiltrate vs pleural effusion. Patient was given 2L IV fluid bolus, as well as Azithromycin, Rocephin, Zosyn, and Vancomycin empirically for pneumonia.  Hbg 9.7-> 8.8, TBili 10.5-> 5.7, , retic    ROS:  Negative except for:    PAST MEDICAL & SURGICAL HISTORY:  Perthes disease, left  Sickle cell anemia  No significant past surgical history      SOCIAL HISTORY:    FAMILY HISTORY:      MEDICATIONS  (STANDING):  azithromycin  IVPB 500 milliGRAM(s) IV Intermittent every 24 hours  cefTRIAXone   IVPB 1 Gram(s) IV Intermittent every 24 hours  enoxaparin Injectable 40 milliGRAM(s) SubCutaneous daily  folic acid 1 milliGRAM(s) Oral daily  lidocaine   Patch 1 Patch Transdermal every 24 hours  multiple electrolytes Injection Type 1 1000 milliLiter(s) (125 mL/Hr) IV Continuous <Continuous>  multivitamin 1 Tablet(s) Oral daily    MEDICATIONS  (PRN):  acetaminophen   Tablet .. 650 milliGRAM(s) Oral every 6 hours PRN Temp greater or equal to 38C (100.4F)  acetaminophen 300 mG/codeine 30 mG 1 Tablet(s) Oral every 4 hours PRN Moderate Pain (4 - 6)  morphine  - Injectable 2 milliGRAM(s) IV Push every 6 hours PRN Severe Pain (7 - 10)      Allergies    No Known Allergies    Intolerances        Vital Signs Last 24 Hrs  T(C): 37.7 (01 May 2019 07:32), Max: 38.3 (30 Apr 2019 16:33)  T(F): 99.8 (01 May 2019 07:32), Max: 101 (30 Apr 2019 16:33)  HR: 112 (01 May 2019 08:00) (106 - 130)  BP: 115/73 (01 May 2019 08:00) (109/69 - 137/85)  BP(mean): 89 (01 May 2019 08:00) (82 - 106)  RR: 31 (01 May 2019 08:00) (18 - 31)  SpO2: 95% (01 May 2019 08:00) (95% - 100%)    PHYSICAL EXAM  General: adult in NAD  HEENT: clear oropharynx, anicteric sclera, pink conjunctiva  Neck: supple  CV: normal S1/S2 with no murmur rubs or gallops  Lungs: positive air movement b/l ant lungs,clear to auscultation, no wheezes, no rales  Abdomen: soft non-tender non-distended, no hepatosplenomegaly  Ext: no clubbing cyanosis or edema  Skin: no rashes and no petechiae  Neuro: alert and oriented X 4, no focal deficits      LABS:                          8.8    24.5  )-----------( 305      ( 01 May 2019 05:11 )             26.4         Mean Cell Volume : 79.0 fl  Mean Cell Hemoglobin : 26.3 pg  Mean Cell Hemoglobin Concentration : 33.3 g/dL  Auto Neutrophil # : x  Auto Lymphocyte # : x  Auto Monocyte # : x  Auto Eosinophil # : x  Auto Basophil # : x  Auto Neutrophil % : x  Auto Lymphocyte % : x  Auto Monocyte % : x  Auto Eosinophil % : x  Auto Basophil % : x      Serial CBC's  05-01 @ 05:11  Hct-26.4 / Hgb-8.8 / Plat-305 / RBC-3.34 / WBC-24.5  Serial CBC's  04-30 @ 16:31  Hct-28.6 / Hgb-9.7 / Plat-336 / RBC-3.63 / WBC-26.8      05-01    137  |  104  |  4.0<L>  ----------------------------<  110  4.3   |  22.0  |  0.46<L>    Ca    10.2      01 May 2019 05:11    TPro  7.6  /  Alb  3.6  /  TBili  5.7<H>  /  DBili  x   /  AST  39  /  ALT  27  /  AlkPhos  168<H>  05-01      PT/INR - ( 30 Apr 2019 16:31 )   PT: 15.2 sec;   INR: 1.31 ratio         PTT - ( 30 Apr 2019 16:31 )  PTT:30.5 sec    Reticulocyte Percent: 9.6 % (05-01-19 @ 05:11)  Reticulocyte Percent: 11.3 % (04-30-19 @ 16:31)              RADIOLOGY & ADDITIONAL STUDIES:  EXAM:  CT ANGIO CHEST (W)AW IC                          PROCEDURE DATE:  04/30/2019          INTERPRETATION:  CLINICAL INFORMATION: Pleuritic chest pain. History of   sickle cell disease. Concern for acute chest syndrome. Evaluate for PE.    COMPARISON: Chest x-ray 4/30/2019    PROCEDURE:   CT Angiography of the Chest.  93 ml of Omnipaque 350 was injected intravenously.   Sagittal and coronal reformats were performed as well as 3D (MIP)   reconstructions.      FINDINGS:    CHEST:     LUNGS AND LARGE AIRWAYS: Patent central airways.  Large consolidation   involving the inferior aspect of the left lower lobe and lingula and   small consolidations in the right lower lobe and right middle lobe.  PLEURA: Small left pleural effusion.  VESSELS: Evaluation of the pulmonary arteries is limited secondary to the   timing of the contrast bolus. There is no main or lobar pulmonary   embolism.  HEART: Heart size is normal. No pericardial effusion.  MEDIASTINUM AND JALEN: No lymphadenopathy.  CHEST WALL AND LOWER NECK: Within normal limits.  VISUALIZED UPPER ABDOMEN: There is cholelithiasis. There is a calcified   structure at the posterior aspect of the left upper quadrant measuring   3.6 cm, likely atrophic/infarcted spleen.  BONES: A few H shaped vertebral bodies are noted consistent with the   history of sickle cell disease.    IMPRESSION:     No central pulmonary embolism.    Large consolidation in the left lower lobe and lingula and smaller   consolidations in the right lower and middle lobes with the differential   including acute chest syndrome, however correlation is also recommended   for pneumonia. 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 Beal in [facility]. Patient presented to ED with complaints of left-sided chest pain and SOB for the past two days. Patient states he had a sickle cell crisis about two weeks prior, took 2 Ibuprofen and Tylenol/Codeine, and the crisis resolved without needing to go to the hospital. However, pain recurred two days ago prior to presentation and was more severe than usual prompting him to seek medical attention. Denies fever or chills. 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 In ED, patient was found to be febrile to 101'F and tachycardic, leukocytosis WBC 26.8, retic 11.3%, lactate 1.3. Chest xray with concerns for dense LLL infiltrate vs pleural effusion. Patient was given 2L IV fluid bolus, as well as Azithromycin, Rocephin, Zosyn, and Vancomycin empirically for pneumonia.  Hbg 9.7-> 8.8, TBili 10.5-> 5.7,     ROS:  As above    PAST MEDICAL & SURGICAL HISTORY:  Perthes disease, left  Sickle cell anemia  No significant past surgical history      SOCIAL HISTORY:    FAMILY HISTORY:      MEDICATIONS  (STANDING):  azithromycin  IVPB 500 milliGRAM(s) IV Intermittent every 24 hours  cefTRIAXone   IVPB 1 Gram(s) IV Intermittent every 24 hours  enoxaparin Injectable 40 milliGRAM(s) SubCutaneous daily  folic acid 1 milliGRAM(s) Oral daily  lidocaine   Patch 1 Patch Transdermal every 24 hours  multiple electrolytes Injection Type 1 1000 milliLiter(s) (125 mL/Hr) IV Continuous <Continuous>  multivitamin 1 Tablet(s) Oral daily    MEDICATIONS  (PRN):  acetaminophen   Tablet .. 650 milliGRAM(s) Oral every 6 hours PRN Temp greater or equal to 38C (100.4F)  acetaminophen 300 mG/codeine 30 mG 1 Tablet(s) Oral every 4 hours PRN Moderate Pain (4 - 6)  morphine  - Injectable 2 milliGRAM(s) IV Push every 6 hours PRN Severe Pain (7 - 10)      Allergies    No Known Allergies    Intolerances        Vital Signs Last 24 Hrs  T(C): 37.7 (01 May 2019 07:32), Max: 38.3 (30 Apr 2019 16:33)  T(F): 99.8 (01 May 2019 07:32), Max: 101 (30 Apr 2019 16:33)  HR: 112 (01 May 2019 08:00) (106 - 130)  BP: 115/73 (01 May 2019 08:00) (109/69 - 137/85)  BP(mean): 89 (01 May 2019 08:00) (82 - 106)  RR: 31 (01 May 2019 08:00) (18 - 31)  SpO2: 95% (01 May 2019 08:00) (95% - 100%)    PHYSICAL EXAM  General: adult in NAD  HEENT: clear oropharynx, anicteric sclera, pink conjunctiva  Neck: supple  CV: tachycardic  Lungs: positive air movement b/l ant lungs,clear to auscultation, no wheezes, no rales  Abdomen: soft non-tender non-distended, no hepatosplenomegaly  Ext: no clubbing cyanosis or edema  Skin: no rashes and no petechiae  Neuro: alert and oriented X 4, no focal deficits      LABS:                          8.8    24.5  )-----------( 305      ( 01 May 2019 05:11 )             26.4         Mean Cell Volume : 79.0 fl  Mean Cell Hemoglobin : 26.3 pg  Mean Cell Hemoglobin Concentration : 33.3 g/dL  Auto Neutrophil # : x  Auto Lymphocyte # : x  Auto Monocyte # : x  Auto Eosinophil # : x  Auto Basophil # : x  Auto Neutrophil % : x  Auto Lymphocyte % : x  Auto Monocyte % : x  Auto Eosinophil % : x  Auto Basophil % : x      Serial CBC's  05-01 @ 05:11  Hct-26.4 / Hgb-8.8 / Plat-305 / RBC-3.34 / WBC-24.5  Serial CBC's  04-30 @ 16:31  Hct-28.6 / Hgb-9.7 / Plat-336 / RBC-3.63 / WBC-26.8      05-01    137  |  104  |  4.0<L>  ----------------------------<  110  4.3   |  22.0  |  0.46<L>    Ca    10.2      01 May 2019 05:11    TPro  7.6  /  Alb  3.6  /  TBili  5.7<H>  /  DBili  x   /  AST  39  /  ALT  27  /  AlkPhos  168<H>  05-01      PT/INR - ( 30 Apr 2019 16:31 )   PT: 15.2 sec;   INR: 1.31 ratio         PTT - ( 30 Apr 2019 16:31 )  PTT:30.5 sec    Reticulocyte Percent: 9.6 % (05-01-19 @ 05:11)  Reticulocyte Percent: 11.3 % (04-30-19 @ 16:31)              RADIOLOGY & ADDITIONAL STUDIES:  EXAM:  CT ANGIO CHEST (W)AW IC                          PROCEDURE DATE:  04/30/2019          INTERPRETATION:  CLINICAL INFORMATION: Pleuritic chest pain. History of   sickle cell disease. Concern for acute chest syndrome. Evaluate for PE.    COMPARISON: Chest x-ray 4/30/2019    PROCEDURE:   CT Angiography of the Chest.  93 ml of Omnipaque 350 was injected intravenously.   Sagittal and coronal reformats were performed as well as 3D (MIP)   reconstructions.      FINDINGS:    CHEST:     LUNGS AND LARGE AIRWAYS: Patent central airways.  Large consolidation   involving the inferior aspect of the left lower lobe and lingula and   small consolidations in the right lower lobe and right middle lobe.  PLEURA: Small left pleural effusion.  VESSELS: Evaluation of the pulmonary arteries is limited secondary to the   timing of the contrast bolus. There is no main or lobar pulmonary   embolism.  HEART: Heart size is normal. No pericardial effusion.  MEDIASTINUM AND JALEN: No lymphadenopathy.  CHEST WALL AND LOWER NECK: Within normal limits.  VISUALIZED UPPER ABDOMEN: There is cholelithiasis. There is a calcified   structure at the posterior aspect of the left upper quadrant measuring   3.6 cm, likely atrophic/infarcted spleen.  BONES: A few H shaped vertebral bodies are noted consistent with the   history of sickle cell disease.    IMPRESSION:     No central pulmonary embolism.    Large consolidation in the left lower lobe and lingula and smaller   consolidations in the right lower and middle lobes with the differential   including acute chest syndrome, however correlation is also recommended   for pneumonia.

## 2019-05-01 NOTE — PROGRESS NOTE ADULT - SUBJECTIVE AND OBJECTIVE BOX
Pt is a 35 yo M who presented to the ED w/ dyspnea and L sided chest pain found to have b/l infiltrates concerning for Acute chest syndrome. Patient was started on IV antibiotics, given IV hydration.   His initial presentation was concerning for underlying PNA as well due to fever and leukocytosis. He was admitted to ICU and today he is being downgraded.   He continues to have L sided chest pain 7/10 in severity at its worst, and decerased to 4/10 after receiving morphine. The pain is worse with inspiration, relieved with laying still. HIs dyspnea is improved. He has no other complaints.   Denies headaches, nausea, vomiting, palpitations, abdominal pain, constipation, diarrhea, melena, hematochezia, dysuria.     Of note patient recently has hx of avascular necrosis of his hip and was seen by ortho in the past.     T(C): 37.7 (05-01-19 @ 07:32), Max: 38.3 (04-30-19 @ 16:33)  HR: 107 (05-01-19 @ 09:00) (106 - 130)  BP: 111/71 (05-01-19 @ 09:00) (109/69 - 137/85)  RR: 19 (05-01-19 @ 09:00) (18 - 31)  SpO2: 98% (05-01-19 @ 09:00) (95% - 100%)  Wt(kg): --    Physical Exam:   GENERAL: well-groomed, well-developed, NAD  HEENT: head NC/AT; EOM intact, conjunctiva & sclera clear; hearing grossly intact, moist mucous membranes  NECK: supple, no JVD  RESPIRATORY: CTA B/L, no wheezing, rales, rhonchi or rubs  CARDIOVASCULAR: S1&S2, tachy, no murmurs or gallops  ABDOMEN: soft, non-tender, non-distended, + Bowel sounds x4 quadrants, no guarding, rebound or rigidity  MUSCULOSKELETAL:  no clubbing, cyanosis or edema of all 4 extremities  LYMPH: no cervical lymphadenopathy  VASCULAR: Radial pulses 2+ bilaterally, no varicose veins   SKIN: warm and dry, color normal  NEUROLOGIC: AA&O X3, CN2-12 grossly intact w/ no focal deficits, no sensory loss, motor Strength 5/5 in UE & LE B/L  Psych: Normal mood and affect, normal behavior

## 2019-05-02 LAB
ANION GAP SERPL CALC-SCNC: 11 MMOL/L — SIGNIFICANT CHANGE UP (ref 5–17)
BUN SERPL-MCNC: 6 MG/DL — LOW (ref 8–20)
CALCIUM SERPL-MCNC: 10.6 MG/DL — HIGH (ref 8.6–10.2)
CHLORIDE SERPL-SCNC: 103 MMOL/L — SIGNIFICANT CHANGE UP (ref 98–107)
CO2 SERPL-SCNC: 22 MMOL/L — SIGNIFICANT CHANGE UP (ref 22–29)
CREAT SERPL-MCNC: 0.35 MG/DL — LOW (ref 0.5–1.3)
GLUCOSE BLDC GLUCOMTR-MCNC: 130 MG/DL — HIGH (ref 70–99)
GLUCOSE SERPL-MCNC: 93 MG/DL — SIGNIFICANT CHANGE UP (ref 70–115)
HCT VFR BLD CALC: 26.5 % — LOW (ref 42–52)
HCT VFR BLD CALC: 27.8 % — LOW (ref 42–52)
HGB BLD-MCNC: 8.9 G/DL — LOW (ref 14–18)
HGB BLD-MCNC: 9.1 G/DL — LOW (ref 14–18)
LACTATE BLDV-MCNC: 1.3 MMOL/L — SIGNIFICANT CHANGE UP (ref 0.5–2)
LDH SERPL L TO P-CCNC: 458 U/L — HIGH (ref 98–192)
MCHC RBC-ENTMCNC: 26.1 PG — LOW (ref 27–31)
MCHC RBC-ENTMCNC: 26.9 PG — LOW (ref 27–31)
MCHC RBC-ENTMCNC: 32.7 G/DL — SIGNIFICANT CHANGE UP (ref 32–36)
MCHC RBC-ENTMCNC: 33.6 G/DL — SIGNIFICANT CHANGE UP (ref 32–36)
MCV RBC AUTO: 79.9 FL — LOW (ref 80–94)
MCV RBC AUTO: 80.1 FL — SIGNIFICANT CHANGE UP (ref 80–94)
PLATELET # BLD AUTO: 264 K/UL — SIGNIFICANT CHANGE UP (ref 150–400)
PLATELET # BLD AUTO: 345 K/UL — SIGNIFICANT CHANGE UP (ref 150–400)
POTASSIUM SERPL-MCNC: 4.6 MMOL/L — SIGNIFICANT CHANGE UP (ref 3.5–5.3)
POTASSIUM SERPL-SCNC: 4.6 MMOL/L — SIGNIFICANT CHANGE UP (ref 3.5–5.3)
RBC # BLD: 3.31 M/UL — LOW (ref 4.6–6.2)
RBC # BLD: 3.48 M/UL — LOW (ref 4.6–6.2)
RBC # BLD: 3.48 M/UL — LOW (ref 4.6–6.2)
RBC # FLD: 22.9 % — HIGH (ref 11–15.6)
RBC # FLD: 23.1 % — HIGH (ref 11–15.6)
RETICS #: 28.7 K/UL — SIGNIFICANT CHANGE UP (ref 25–125)
RETICS/RBC NFR: 8.2 % — HIGH (ref 0.5–2.5)
SODIUM SERPL-SCNC: 136 MMOL/L — SIGNIFICANT CHANGE UP (ref 135–145)
WBC # BLD: 23.3 K/UL — HIGH (ref 4.8–10.8)
WBC # BLD: 28.4 K/UL — HIGH (ref 4.8–10.8)
WBC # FLD AUTO: 23.3 K/UL — HIGH (ref 4.8–10.8)
WBC # FLD AUTO: 28.4 K/UL — HIGH (ref 4.8–10.8)

## 2019-05-02 PROCEDURE — 12345: CPT | Mod: NC

## 2019-05-02 PROCEDURE — 99233 SBSQ HOSP IP/OBS HIGH 50: CPT

## 2019-05-02 RX ORDER — ACETAMINOPHEN 500 MG
1000 TABLET ORAL ONCE
Qty: 0 | Refills: 0 | Status: COMPLETED | OUTPATIENT
Start: 2019-05-02 | End: 2019-05-02

## 2019-05-02 RX ORDER — PSYLLIUM SEED (WITH DEXTROSE)
1 POWDER (GRAM) ORAL
Qty: 0 | Refills: 0 | Status: DISCONTINUED | OUTPATIENT
Start: 2019-05-02 | End: 2019-05-06

## 2019-05-02 RX ORDER — SACCHAROMYCES BOULARDII 250 MG
250 POWDER IN PACKET (EA) ORAL
Qty: 0 | Refills: 0 | Status: DISCONTINUED | OUTPATIENT
Start: 2019-05-02 | End: 2019-05-06

## 2019-05-02 RX ORDER — SODIUM CHLORIDE 9 MG/ML
1000 INJECTION, SOLUTION INTRAVENOUS ONCE
Qty: 0 | Refills: 0 | Status: COMPLETED | OUTPATIENT
Start: 2019-05-02 | End: 2019-05-02

## 2019-05-02 RX ADMIN — Medication 1 TABLET(S): at 18:58

## 2019-05-02 RX ADMIN — MORPHINE SULFATE 2 MILLIGRAM(S): 50 CAPSULE, EXTENDED RELEASE ORAL at 01:21

## 2019-05-02 RX ADMIN — MORPHINE SULFATE 2 MILLIGRAM(S): 50 CAPSULE, EXTENDED RELEASE ORAL at 01:51

## 2019-05-02 RX ADMIN — HYDROXYUREA 500 MILLIGRAM(S): 500 CAPSULE ORAL at 06:48

## 2019-05-02 RX ADMIN — HYDROXYUREA 500 MILLIGRAM(S): 500 CAPSULE ORAL at 17:42

## 2019-05-02 RX ADMIN — Medication 650 MILLIGRAM(S): at 21:36

## 2019-05-02 RX ADMIN — Medication 1 TABLET(S): at 13:00

## 2019-05-02 RX ADMIN — Medication 1 TABLET(S): at 12:09

## 2019-05-02 RX ADMIN — Medication 1 MILLIGRAM(S): at 12:08

## 2019-05-02 RX ADMIN — AZITHROMYCIN 255 MILLIGRAM(S): 500 TABLET, FILM COATED ORAL at 17:43

## 2019-05-02 RX ADMIN — Medication 250 MILLIGRAM(S): at 17:42

## 2019-05-02 RX ADMIN — Medication 1 PACKET(S): at 17:43

## 2019-05-02 RX ADMIN — CEFTRIAXONE 100 GRAM(S): 500 INJECTION, POWDER, FOR SOLUTION INTRAMUSCULAR; INTRAVENOUS at 21:30

## 2019-05-02 RX ADMIN — LIDOCAINE 1 PATCH: 4 CREAM TOPICAL at 21:30

## 2019-05-02 RX ADMIN — SODIUM CHLORIDE 125 MILLILITER(S): 9 INJECTION, SOLUTION INTRAVENOUS at 01:22

## 2019-05-02 RX ADMIN — LIDOCAINE 1 PATCH: 4 CREAM TOPICAL at 06:13

## 2019-05-02 RX ADMIN — Medication 1 TABLET(S): at 19:58

## 2019-05-02 RX ADMIN — Medication 650 MILLIGRAM(S): at 22:36

## 2019-05-02 RX ADMIN — LIDOCAINE 1 PATCH: 4 CREAM TOPICAL at 11:04

## 2019-05-02 RX ADMIN — SODIUM CHLORIDE 2000 MILLILITER(S): 9 INJECTION, SOLUTION INTRAVENOUS at 23:52

## 2019-05-02 RX ADMIN — SODIUM CHLORIDE 125 MILLILITER(S): 9 INJECTION, SOLUTION INTRAVENOUS at 12:08

## 2019-05-02 RX ADMIN — Medication 1 TABLET(S): at 12:08

## 2019-05-02 NOTE — PROGRESS NOTE ADULT - SUBJECTIVE AND OBJECTIVE BOX
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 Beal in [facility]. Patient presented to ED with complaints of left-sided chest pain and SOB for the past two days. Patient states he had a sickle cell crisis about two weeks prior, took 2 Ibuprofen and Tylenol/Codeine, and the crisis resolved without needing to go to the hospital. However, pain recurred two days ago prior to presentation and was more severe than usual prompting him to seek medical attention. Denies fever or chills. Patient states his last crisis was early April 2018 when he was admitted to [facility] for workup, MRI revealed necrosis of hip with residual damage, however he was evaluated by orthopedic and was told he did not require surgery because he maintained full mobility of his hip. In ED, patient was found to be febrile to 101'F and tachycardic, leukocytosis WBC 26.8, retic 11.3%, lactate 1.3. Chest xray with concerns for dense LLL infiltrate vs pleural effusion. Patient was given 2L IV fluid bolus, as well as Azithromycin, Rocephin, Zosyn, and Vancomycin empirically for pneumonia.  Hbg 9.7-> 8.8, TBili 10.5-> 5.7,     Interval history:  Breathing much more comfortably; reports mild left anterior chest pain. denies cough or hemoptysis; Hb 9.7 today    ROS:  As above    PAST MEDICAL & SURGICAL HISTORY:  Perthes disease, left  Sickle cell anemia  No significant past surgical history                MEDICATIONS  (STANDING):  azithromycin  IVPB 500 milliGRAM(s) IV Intermittent every 24 hours  cefTRIAXone   IVPB 1 Gram(s) IV Intermittent every 24 hours  enoxaparin Injectable 40 milliGRAM(s) SubCutaneous daily  folic acid 1 milliGRAM(s) Oral daily  lidocaine   Patch 1 Patch Transdermal every 24 hours  multiple electrolytes Injection Type 1 1000 milliLiter(s) (125 mL/Hr) IV Continuous <Continuous>  multivitamin 1 Tablet(s) Oral daily    MEDICATIONS  (PRN):  acetaminophen   Tablet .. 650 milliGRAM(s) Oral every 6 hours PRN Temp greater or equal to 38C (100.4F)  acetaminophen 300 mG/codeine 30 mG 1 Tablet(s) Oral every 4 hours PRN Moderate Pain (4 - 6)  morphine  - Injectable 2 milliGRAM(s) IV Push every 6 hours PRN Severe Pain (7 - 10)      Allergies    No Known Allergies    Intolerances        Vital Signs Last 24 Hrs  T(C): 37.7 (01 May 2019 07:32), Max: 38.3 (30 Apr 2019 16:33)  T(F): 99.8 (01 May 2019 07:32), Max: 101 (30 Apr 2019 16:33)  HR: 112 (01 May 2019 08:00) (106 - 130)  BP: 115/73 (01 May 2019 08:00) (109/69 - 137/85)  BP(mean): 89 (01 May 2019 08:00) (82 - 106)  RR: 31 (01 May 2019 08:00) (18 - 31)  SpO2: 95% (01 May 2019 08:00) (95% - 100%)    PHYSICAL EXAM  General: adult in NAD  HEENT: clear oropharynx, anicteric sclera, pink conjunctiva; scleral icterus  Neck: supple  CV: RRR normal S1S2  Lungs: positive air movement b/l ant lungs,clear to auscultation, no wheezes, no rales  Abdomen: soft non-tender non-distended, no hepatosplenomegaly  Ext: no clubbing cyanosis or edema  Skin: no rashes and no petechiae  Neuro: alert and oriented X 4, no focal deficits      LABS:  WBC 28.4, H/H 9.1/27.8; plt ct 264  Retic 8.2                        8.8    24.5  )-----------( 305      ( 01 May 2019 05:11 )             26.4         Mean Cell Volume : 79.0 fl  Mean Cell Hemoglobin : 26.3 pg  Mean Cell Hemoglobin Concentration : 33.3 g/dL  Auto Neutrophil # : x  Auto Lymphocyte # : x  Auto Monocyte # : x  Auto Eosinophil # : x  Auto Basophil # : x  Auto Neutrophil % : x  Auto Lymphocyte % : x  Auto Monocyte % : x  Auto Eosinophil % : x  Auto Basophil % : x      Serial CBC's  05-01 @ 05:11  Hct-26.4 / Hgb-8.8 / Plat-305 / RBC-3.34 / WBC-24.5  Serial CBC's  04-30 @ 16:31  Hct-28.6 / Hgb-9.7 / Plat-336 / RBC-3.63 / WBC-26.8      05-01    137  |  104  |  4.0<L>  ----------------------------<  110  4.3   |  22.0  |  0.46<L>    Ca    10.2      01 May 2019 05:11    TPro  7.6  /  Alb  3.6  /  TBili  5.7<H>  /  DBili  x   /  AST  39  /  ALT  27  /  AlkPhos  168<H>  05-01      PT/INR - ( 30 Apr 2019 16:31 )   PT: 15.2 sec;   INR: 1.31 ratio         PTT - ( 30 Apr 2019 16:31 )  PTT:30.5 sec    Reticulocyte Percent: 9.6 % (05-01-19 @ 05:11)  Reticulocyte Percent: 11.3 % (04-30-19 @ 16:31)              RADIOLOGY & ADDITIONAL STUDIES:  EXAM:  CT ANGIO CHEST (W)AW IC                          PROCEDURE DATE:  04/30/2019          INTERPRETATION:  CLINICAL INFORMATION: Pleuritic chest pain. History of   sickle cell disease. Concern for acute chest syndrome. Evaluate for PE.    COMPARISON: Chest x-ray 4/30/2019    PROCEDURE:   CT Angiography of the Chest.  93 ml of Omnipaque 350 was injected intravenously.   Sagittal and coronal reformats were performed as well as 3D (MIP)   reconstructions.      FINDINGS:    CHEST:     LUNGS AND LARGE AIRWAYS: Patent central airways.  Large consolidation   involving the inferior aspect of the left lower lobe and lingula and   small consolidations in the right lower lobe and right middle lobe.  PLEURA: Small left pleural effusion.  VESSELS: Evaluation of the pulmonary arteries is limited secondary to the   timing of the contrast bolus. There is no main or lobar pulmonary   embolism.  HEART: Heart size is normal. No pericardial effusion.  MEDIASTINUM AND JALEN: No lymphadenopathy.  CHEST WALL AND LOWER NECK: Within normal limits.  VISUALIZED UPPER ABDOMEN: There is cholelithiasis. There is a calcified   structure at the posterior aspect of the left upper quadrant measuring   3.6 cm, likely atrophic/infarcted spleen.  BONES: A few H shaped vertebral bodies are noted consistent with the   history of sickle cell disease.    IMPRESSION:     No central pulmonary embolism.    Large consolidation in the left lower lobe and lingula and smaller   consolidations in the right lower and middle lobes with the differential   including acute chest syndrome, however correlation is also recommended   for pneumonia.

## 2019-05-02 NOTE — CHART NOTE - NSCHARTNOTEFT_GEN_A_CORE
Code sepsis called 2/2 T=101.4 rectal;       35y/o male with pmh sickle cell dz, admitted for suspected acute chest syndrome with sickle cell crisis.    Code sepsis called 2/2 fever, tachycardia, leukocytosis.  Patient seen at bedside, relaxed;   reports pain is 3/10, denies any sob, difficulty breathing, chills;     ICU Vital Signs Last 24 Hrs  T(C): 38.6 (02 May 2019 22:47), Max: 38.9 (01 May 2019 23:55)  T(F): 101.4 (02 May 2019 22:47), Max: 102.1 (01 May 2019 23:55)  HR: 120 (02 May 2019 22:47) (90 - 121)  BP: 117/76 (02 May 2019 22:47) (110/73 - 122/82)  RR: 18 (02 May 2019 22:47) (18 - 20)  SpO2: 98% (02 May 2019 22:47) (93% - 100%)    general- adult male, sitting in bed, nad  resp- positive crackles in rll;  nc =3liters;    cardio- tachycardic rate, regular rhythm  ext- no edema appreciated;       a/p  35y/o male with pmh sickle cell dz, admitted for suspected acute chest syndrome with sickle cell crisis, patient had known fever, tachycardia, leukocytosis during hospitalization, recently downgraded from icu.   Heme onc on board, started on hydroxyurea;     -patient given prn tylenol-codeine for 3/10 abdominal pain;   -bolus 1000cc plasmalyte;     -encouraged use of incentive spirometer;   -no hypoxia recorded;  patient denies any sob;   encourage out of bed;   -previous temp 3/2 at 2am; blood culture negx2 at 48hours;  patient denies any urinary symptoms or worsening of sob;    -no acute bleeding; Code sepsis called 2/2 T=101.4 rectal;       33y/o male with pmh sickle cell dz, admitted for suspected acute chest syndrome with sickle cell crisis.    Code sepsis called 2/2 fever, tachycardia, leukocytosis.  Patient seen at bedside, relaxed;   reports pain is 3/10, denies any sob, difficulty breathing, chills;     ICU Vital Signs Last 24 Hrs  T(C): 38.6 (02 May 2019 22:47), Max: 38.9 (01 May 2019 23:55)  T(F): 101.4 (02 May 2019 22:47), Max: 102.1 (01 May 2019 23:55)  HR: 120 (02 May 2019 22:47) (90 - 121)  BP: 117/76 (02 May 2019 22:47) (110/73 - 122/82)  RR: 18 (02 May 2019 22:47) (18 - 20)  SpO2: 98% (02 May 2019 22:47) (93% - 100%)    general- adult male, sitting in bed, nad  resp- positive crackles in rll;  nc =3liters;    cardio- tachycardic rate, regular rhythm  ext- no edema appreciated;       a/p  33y/o male with pmh sickle cell dz, admitted for suspected acute chest syndrome with sickle cell crisis, patient had known fever, tachycardia, leukocytosis during hospitalization, recently downgraded from icu.   Heme onc on board, started on hydroxyurea;     -patient given prn tylenol-codeine for 3/10 abdominal pain;   -bolus 1000cc plasmalyte;     -encouraged use of incentive spirometer;   -no hypoxia recorded;  patient denies any sob;   encourage out of bed;   -previous temp 3/2 at 2am; blood culture negx2 at 48hours;  patient denies any urinary symptoms or worsening of sob;    -no acute bleeding;      Attending addendum: Came to bedside, RRT in progress with Dr. Gonzales at bedside.  Case discussed with Christian and CXR reviewed- findings looks somewhat worse yolanda on right side with possibly component of congestion but doesn't look clinically overloaded, will broaden abx to vanco, zosyn.  Will maintain IVF for now, but volume status should be closely monitored. Code sepsis called 2/2 T=101.4 rectal;       33y/o male with pmh sickle cell dz, admitted for suspected acute chest syndrome with sickle cell crisis.    Code sepsis called 2/2 fever, tachycardia, leukocytosis.  Patient seen at bedside, relaxed;   reports pain is 3/10, denies any sob, difficulty breathing, chills;     ICU Vital Signs Last 24 Hrs  T(C): 38.6 (02 May 2019 22:47), Max: 38.9 (01 May 2019 23:55)  T(F): 101.4 (02 May 2019 22:47), Max: 102.1 (01 May 2019 23:55)  HR: 120 (02 May 2019 22:47) (90 - 121)  BP: 117/76 (02 May 2019 22:47) (110/73 - 122/82)  RR: 18 (02 May 2019 22:47) (18 - 20)  SpO2: 98% (02 May 2019 22:47) (93% - 100%)    general- adult male, sitting in bed, nad  resp- positive crackles in right upper and lower lung;  nc =3liters;    cardio- tachycardic rate, regular rhythm  ext- no edema appreciated;       a/p  33y/o male with pmh sickle cell dz, admitted for suspected acute chest syndrome with sickle cell crisis, patient had known fever, tachycardia, leukocytosis during hospitalization, recently downgraded from icu.   Heme onc on board, started on hydroxyurea;     -patient given prn tylenol-codeine for 3/10 abdominal pain;   -bolus 1000cc plasmalyte;     -encouraged use of incentive spirometer;   -no hypoxia recorded;  patient denies any sob;   encourage out of bed;   -previous temp 3/2 at 2am; blood culture negx2 at 48hours;  patient denies any urinary symptoms or worsening of sob;    -no acute bleeding;      Attending addendum: Came to bedside, RRT in progress with Dr. Gonzales at bedside.  Case discussed with Christian and CXR reviewed- findings looks somewhat worse yolanda on right side with possibly component of congestion but doesn't look clinically overloaded, will broaden abx to vanco, zosyn.  Will maintain IVF for now, but volume status should be closely monitored. Code sepsis called 2/2 T=101.4 rectaL, tachycardia, leukocytosis. (CODE SEPSIS SUBSEQUENTLY CANCELLED)    35y/o male with pmh sickle cell dz, admitted for suspected acute chest syndrome with sickle cell crisis.    Code sepsis called 2/2 fever, tachycardia, leukocytosis.  Patient seen at bedside, relaxed;   reports pain is 3/10, denies any sob, difficulty breathing, chills;     ICU Vital Signs Last 24 Hrs  T(C): 38.6 (02 May 2019 22:47), Max: 38.9 (01 May 2019 23:55)  T(F): 101.4 (02 May 2019 22:47), Max: 102.1 (01 May 2019 23:55)  HR: 120 (02 May 2019 22:47) (90 - 121)  BP: 117/76 (02 May 2019 22:47) (110/73 - 122/82)  RR: 18 (02 May 2019 22:47) (18 - 20)  SpO2: 98% (02 May 2019 22:47) (93% - 100%)    general- adult male, sitting in bed, nad  resp- positive crackles in right upper and lower lung;  nc =3liters;    cardio- tachycardic rate, regular rhythm  ext- no edema appreciated;       a/p  35y/o male with pmh sickle cell dz, admitted for suspected acute chest syndrome with sickle cell crisis, patient had known fever, tachycardia, leukocytosis during hospitalization, recently downgraded from icu.   Heme onc on board, started on hydroxyurea;     -patient given prn tylenol-codeine for 3/10 abdominal pain;   -bolus 1000cc plasmalyte;     -encouraged use of incentive spirometer;   -no hypoxia recorded;  patient denies any sob;   encourage out of bed;   -previous febrile temp 3/2 at 2am; blood culture negx2 at 48hours;  patient denies any urinary symptoms or worsening of sob;    -no acute bleeding;      Attending addendum: Came to bedside, RRT in progress with Dr. Gonzales at bedside.  Case discussed with Christian and CXR reviewed- findings looks somewhat worse yolanda on right side with possibly component of congestion but doesn't look clinically overloaded, will broaden abx to vanco, zosyn.  Will maintain IVF for now, but volume status should be closely monitored.

## 2019-05-02 NOTE — PROGRESS NOTE ADULT - SUBJECTIVE AND OBJECTIVE BOX
HOSPITALIST PROGRESS NOTE    STEVIE DEAL  [id number]  34yMale    Patient is a 34y old  Male who presents with a chief complaint of Acute Chest Syndrome (01 May 2019 09:57)      SUBJECTIVE:   Chart reviewed since admission  Patient seen and examined at bedside for sickle cell Acute chest syndrome, sickle cell anemia  Feels better today.  Complaining of constipation. No abdominal pain, nausea or vomiting      OBJECTIVE:  Vital Signs Last 24 Hrs  T(C): 37.3 (02 May 2019 10:00), Max: 39.3 (01 May 2019 21:30)  T(F): 99.2 (02 May 2019 10:00), Max: 102.7 (01 May 2019 21:30)  HR: 90 (02 May 2019 10:00) (90 - 129)  BP: 110/73 (02 May 2019 10:00) (110/73 - 157/88)   RR: 18 (02 May 2019 10:00) (18 - 22)  SpO2: 100% (02 May 2019 10:00) (92% - 100%)    PHYSICAL EXAMINATION  General: Lying in bed, NAD  HEENT:  extraocular movements intact, moist oral mucosa  NECK:  Supple  CVS: regular rate and rhythm S1 S2  RESP:  Decreased sounds LLL, fine crackles RLL  GI:  Soft nondistended nontender BS+  : Noo suprapubic tenderness  MS:  FROM  CNS:  AAOx3, no focal deficit noted  INTEG:  Warm dry skin  PSYCH:  Fair mood    MONITOR:  CAPILLARY BLOOD GLUCOSE            I&O's Summary    01 May 2019 07:01  -  02 May 2019 07:00  --------------------------------------------------------  IN: 1450 mL / OUT: 2575 mL / NET: -1125 mL                            9.1    28.4  )-----------( 264      ( 02 May 2019 07:58 )             27.8     Procalcitonin, Serum (19 @ 05:11)    Procalcitonin, Serum: 0.32: Reference range change as of 3/21/2019 ng/mL      PT/INR - ( 2019 16:31 )   PT: 15.2 sec;   INR: 1.31 ratio         PTT - ( 2019 16:31 )  PTT:30.5 sec      136  |  103  |  6.0<L>  ----------------------------<  93  4.6   |  22.0  |  0.35<L>    Ca    10.6<H>      02 May 2019 07:58    TPro  7.6  /  Alb  3.6  /  TBili  5.7<H>  /  DBili  x   /  AST  39  /  ALT  27  /  AlkPhos  168<H>  05-    CARDIAC MARKERS ( 2019 16:31 )  x     / <0.01 ng/mL / 24 U/L / x     / x          Urinalysis Basic - ( 2019 17:37 )    Color: Delores / Appearance: Clear / S.010 / pH: x  Gluc: x / Ketone: Negative  / Bili: Moderate / Urobili: 12 mg/dL   Blood: x / Protein: 30 mg/dL / Nitrite: Positive   Leuk Esterase: Trace / RBC: 0-2 /HPF / WBC 0-2   Sq Epi: x / Non Sq Epi: Occasional / Bacteria: Few        Culture:    TTE:    RADIOLOGY  < from: CT Angio Chest w/ IV Cont (19 @ 18:13) >     EXAM:  CT ANGIO CHEST (W)AW IC                          PROCEDURE DATE:  2019          INTERPRETATION:  CLINICAL INFORMATION: Pleuritic chest pain. History of   sickle cell disease. Concern for acute chest syndrome. Evaluate for PE.    COMPARISON: Chest x-ray 2019    PROCEDURE:   CT Angiography of the Chest.  93 ml of Omnipaque 350 was injected intravenously.   Sagittal and coronal reformats were performed as well as 3D (MIP)   reconstructions.      FINDINGS:    CHEST:     LUNGS ANDLARGE AIRWAYS: Patent central airways.  Large consolidation   involving the inferior aspect of the left lower lobe and lingula and   small consolidations in the right lower lobe and right middle lobe.  PLEURA: Small left pleural effusion.  VESSELS: Evaluation of the pulmonary arteries is limited secondary to the   timing of the contrast bolus. There is no main or lobar pulmonary   embolism.  HEART: Heart size is normal. No pericardial effusion.  MEDIASTINUM AND JALEN: No lymphadenopathy.  CHEST WALL AND LOWER NECK: Within normal limits.  VISUALIZED UPPER ABDOMEN: There is cholelithiasis. There is a calcified   structure at the posterior aspect of the left upper quadrant measuring   3.6 cm, likely atrophic/infarcted spleen.  BONES: A few H shaped vertebral bodies are noted consistent with the   history of sickle cell disease.    IMPRESSION:     No central pulmonary embolism.    Large consolidation in the left lower lobe and lingula and smaller   consolidations in the right lower and middle lobes with the differential   including acute chest syndrome, however correlation is also recommended   for pneumonia.    The findings were discussed with Dr. Dacosta on 2019 at 6:47 PM                  KARTHIKEYAN CROCKETT   This document has been electronically signed. 2019  6:50PM        < end of copied text >        MEDICATIONS  (STANDING):  azithromycin  IVPB 500 milliGRAM(s) IV Intermittent every 24 hours  cefTRIAXone   IVPB 1 Gram(s) IV Intermittent every 24 hours  enoxaparin Injectable 40 milliGRAM(s) SubCutaneous daily  folic acid 1 milliGRAM(s) Oral daily  hydroxyurea 500 milliGRAM(s) Oral two times a day  lidocaine   Patch 1 Patch Transdermal every 24 hours  morphine  - Injectable 2 milliGRAM(s) IV Push once  multiple electrolytes Injection Type 1 1000 milliLiter(s) (125 mL/Hr) IV Continuous <Continuous>  multivitamin 1 Tablet(s) Oral daily  psyllium Powder 1 Packet(s) Oral two times a day  saccharomyces boulardii 250 milliGRAM(s) Oral two times a day      MEDICATIONS  (PRN):  acetaminophen   Tablet .. 650 milliGRAM(s) Oral every 6 hours PRN Temp greater or equal to 38C (100.4F)  acetaminophen 300 mG/codeine 30 mG 1 Tablet(s) Oral every 4 hours PRN Moderate Pain (4 - 6)  morphine  - Injectable 2 milliGRAM(s) IV Push every 6 hours PRN Severe Pain (7 - 10)

## 2019-05-02 NOTE — PROGRESS NOTE ADULT - ASSESSMENT
Pt is a 35 yo M who presented to the ED w/ dyspnea and L sided chest pain found to have b/l infiltrates concerning for Acute chest syndrome and underlying sickle cell crisis. No other PMH     Possible acute chest syndrome: Symptomatically better, though continues to have fevers and leukocytosis  -continue to monitor Hgb and retic count and LDH as recommended by hematology. Improved LDH, bilirubin  -continue IVF  -Continue Hydroxyurea and Folic acid  -continue analgesia, oxygen, Rocephin and azithromycin for possible pneumonia. Follow up cultures    Sickle cell crisis: see above.   -hematology rec's appreciated.     Constipation   - Add Metamucil    Added Florastor given antibiotics    DVT ppx: lovenox

## 2019-05-02 NOTE — PROGRESS NOTE ADULT - ASSESSMENT
Sickle cell crisis with history of avascular necrosis of left hip in 4/2018   A/w fever, SOB, chest pain  CT finding concerning for acute chest syndrome vs PNA    -IVF, antibiotics, supplement oxygen, analgesic  -discussed about Exchange transfusion if clinical deterioration. Patient's mother stated that patient had severe complication with exchange transfusion when he was 1 and 1/2 year old. We then discussed about cross matched prbc transfusion. Discussed risk and benefit. They want to hold off for now. Clinically much improved; would continue treating for pneumonia; does not need exchange tx at present; will monitor closely  -Start hydrea 500 mg BID in attempt to decrease Hbg S production. Discussed with patient and his mother at bedside including risk and benefit.  -trend LDH and retic  -discussed with patient with pt  -Will follow. Please call if any question [phone]

## 2019-05-03 LAB
ALBUMIN SERPL ELPH-MCNC: 2.8 G/DL — LOW (ref 3.3–5.2)
ALP SERPL-CCNC: 140 U/L — HIGH (ref 40–120)
ALT FLD-CCNC: 20 U/L — SIGNIFICANT CHANGE UP
ANION GAP SERPL CALC-SCNC: 11 MMOL/L — SIGNIFICANT CHANGE UP (ref 5–17)
AST SERPL-CCNC: 32 U/L — SIGNIFICANT CHANGE UP
BILIRUB SERPL-MCNC: 2.8 MG/DL — HIGH (ref 0.4–2)
BUN SERPL-MCNC: 5 MG/DL — LOW (ref 8–20)
CALCIUM SERPL-MCNC: 9.7 MG/DL — SIGNIFICANT CHANGE UP (ref 8.6–10.2)
CHLORIDE SERPL-SCNC: 102 MMOL/L — SIGNIFICANT CHANGE UP (ref 98–107)
CO2 SERPL-SCNC: 23 MMOL/L — SIGNIFICANT CHANGE UP (ref 22–29)
CREAT SERPL-MCNC: 0.4 MG/DL — LOW (ref 0.5–1.3)
GLUCOSE SERPL-MCNC: 106 MG/DL — SIGNIFICANT CHANGE UP (ref 70–115)
HCT VFR BLD CALC: 25.6 % — LOW (ref 42–52)
HGB BLD-MCNC: 8.3 G/DL — LOW (ref 14–18)
MCHC RBC-ENTMCNC: 26.2 PG — LOW (ref 27–31)
MCHC RBC-ENTMCNC: 32.4 G/DL — SIGNIFICANT CHANGE UP (ref 32–36)
MCV RBC AUTO: 80.8 FL — SIGNIFICANT CHANGE UP (ref 80–94)
PLATELET # BLD AUTO: 332 K/UL — SIGNIFICANT CHANGE UP (ref 150–400)
POTASSIUM SERPL-MCNC: 4.2 MMOL/L — SIGNIFICANT CHANGE UP (ref 3.5–5.3)
POTASSIUM SERPL-SCNC: 4.2 MMOL/L — SIGNIFICANT CHANGE UP (ref 3.5–5.3)
PROCALCITONIN SERPL-MCNC: 0.18 NG/ML — HIGH (ref 0.02–0.1)
PROT SERPL-MCNC: 7.1 G/DL — SIGNIFICANT CHANGE UP (ref 6.6–8.7)
RBC # BLD: 3.17 M/UL — LOW (ref 4.6–6.2)
RBC # FLD: 22.4 % — HIGH (ref 11–15.6)
SODIUM SERPL-SCNC: 136 MMOL/L — SIGNIFICANT CHANGE UP (ref 135–145)
WBC # BLD: 18.4 K/UL — HIGH (ref 4.8–10.8)
WBC # FLD AUTO: 18.4 K/UL — HIGH (ref 4.8–10.8)

## 2019-05-03 PROCEDURE — 71045 X-RAY EXAM CHEST 1 VIEW: CPT | Mod: 26

## 2019-05-03 PROCEDURE — 99232 SBSQ HOSP IP/OBS MODERATE 35: CPT

## 2019-05-03 RX ORDER — PIPERACILLIN AND TAZOBACTAM 4; .5 G/20ML; G/20ML
3.38 INJECTION, POWDER, LYOPHILIZED, FOR SOLUTION INTRAVENOUS EVERY 8 HOURS
Qty: 0 | Refills: 0 | Status: DISCONTINUED | OUTPATIENT
Start: 2019-05-03 | End: 2019-05-06

## 2019-05-03 RX ORDER — SODIUM CHLORIDE 9 MG/ML
1000 INJECTION, SOLUTION INTRAVENOUS
Qty: 0 | Refills: 0 | Status: DISCONTINUED | OUTPATIENT
Start: 2019-05-03 | End: 2019-05-04

## 2019-05-03 RX ORDER — VANCOMYCIN HCL 1 G
VIAL (EA) INTRAVENOUS
Qty: 0 | Refills: 0 | Status: DISCONTINUED | OUTPATIENT
Start: 2019-05-03 | End: 2019-05-04

## 2019-05-03 RX ORDER — VANCOMYCIN HCL 1 G
1250 VIAL (EA) INTRAVENOUS EVERY 8 HOURS
Qty: 0 | Refills: 0 | Status: DISCONTINUED | OUTPATIENT
Start: 2019-05-03 | End: 2019-05-04

## 2019-05-03 RX ORDER — VANCOMYCIN HCL 1 G
VIAL (EA) INTRAVENOUS
Qty: 0 | Refills: 0 | Status: DISCONTINUED | OUTPATIENT
Start: 2019-05-03 | End: 2019-05-03

## 2019-05-03 RX ORDER — VANCOMYCIN HCL 1 G
1250 VIAL (EA) INTRAVENOUS ONCE
Qty: 0 | Refills: 0 | Status: COMPLETED | OUTPATIENT
Start: 2019-05-03 | End: 2019-05-03

## 2019-05-03 RX ADMIN — Medication 1 MILLIGRAM(S): at 12:20

## 2019-05-03 RX ADMIN — Medication 166.67 MILLIGRAM(S): at 21:36

## 2019-05-03 RX ADMIN — LIDOCAINE 1 PATCH: 4 CREAM TOPICAL at 21:36

## 2019-05-03 RX ADMIN — Medication 1 TABLET(S): at 12:20

## 2019-05-03 RX ADMIN — Medication 166.67 MILLIGRAM(S): at 06:09

## 2019-05-03 RX ADMIN — LIDOCAINE 1 PATCH: 4 CREAM TOPICAL at 07:45

## 2019-05-03 RX ADMIN — Medication 1 TABLET(S): at 21:46

## 2019-05-03 RX ADMIN — PIPERACILLIN AND TAZOBACTAM 25 GRAM(S): 4; .5 INJECTION, POWDER, LYOPHILIZED, FOR SOLUTION INTRAVENOUS at 23:23

## 2019-05-03 RX ADMIN — Medication 1 TABLET(S): at 13:15

## 2019-05-03 RX ADMIN — PIPERACILLIN AND TAZOBACTAM 25 GRAM(S): 4; .5 INJECTION, POWDER, LYOPHILIZED, FOR SOLUTION INTRAVENOUS at 15:06

## 2019-05-03 RX ADMIN — Medication 250 MILLIGRAM(S): at 05:10

## 2019-05-03 RX ADMIN — SODIUM CHLORIDE 100 MILLILITER(S): 9 INJECTION, SOLUTION INTRAVENOUS at 12:20

## 2019-05-03 RX ADMIN — Medication 1 PACKET(S): at 17:18

## 2019-05-03 RX ADMIN — Medication 166.67 MILLIGRAM(S): at 12:22

## 2019-05-03 RX ADMIN — HYDROXYUREA 500 MILLIGRAM(S): 500 CAPSULE ORAL at 17:18

## 2019-05-03 RX ADMIN — Medication 1 TABLET(S): at 00:19

## 2019-05-03 RX ADMIN — HYDROXYUREA 500 MILLIGRAM(S): 500 CAPSULE ORAL at 05:10

## 2019-05-03 RX ADMIN — Medication 1 PACKET(S): at 05:10

## 2019-05-03 RX ADMIN — Medication 1 TABLET(S): at 18:05

## 2019-05-03 RX ADMIN — Medication 1 TABLET(S): at 17:17

## 2019-05-03 RX ADMIN — Medication 400 MILLIGRAM(S): at 00:20

## 2019-05-03 RX ADMIN — Medication 1 TABLET(S): at 22:46

## 2019-05-03 RX ADMIN — PIPERACILLIN AND TAZOBACTAM 25 GRAM(S): 4; .5 INJECTION, POWDER, LYOPHILIZED, FOR SOLUTION INTRAVENOUS at 06:09

## 2019-05-03 RX ADMIN — Medication 166.67 MILLIGRAM(S): at 02:32

## 2019-05-03 RX ADMIN — Medication 1 TABLET(S): at 01:20

## 2019-05-03 RX ADMIN — LIDOCAINE 1 PATCH: 4 CREAM TOPICAL at 10:21

## 2019-05-03 RX ADMIN — Medication 1000 MILLIGRAM(S): at 01:22

## 2019-05-03 RX ADMIN — Medication 250 MILLIGRAM(S): at 17:18

## 2019-05-03 RX ADMIN — SODIUM CHLORIDE 125 MILLILITER(S): 9 INJECTION, SOLUTION INTRAVENOUS at 00:19

## 2019-05-03 NOTE — PROGRESS NOTE ADULT - SUBJECTIVE AND OBJECTIVE BOX
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 Beal in [facility]. Patient presented to ED with complaints of left-sided chest pain and SOB for the past two days. Patient states he had a sickle cell crisis about two weeks prior, took 2 Ibuprofen and Tylenol/Codeine, and the crisis resolved without needing to go to the hospital. However, pain recurred two days ago prior to presentation and was more severe than usual prompting him to seek medical attention. Denies fever or chills. Patient states his last crisis was early April 2018 when he was admitted to [facility] for workup, MRI revealed necrosis of hip with residual damage, however he was evaluated by orthopedic and was told he did not require surgery because he maintained full mobility of his hip. In ED, patient was found to be febrile to 101'F and tachycardic, leukocytosis WBC 26.8, retic 11.3%, lactate 1.3. Chest xray with concerns for dense LLL infiltrate vs pleural effusion. Patient was given 2L IV fluid bolus, as well as Azithromycin, Rocephin, Zosyn, and Vancomycin empirically for pneumonia.  Hbg 9.7-> 8.8, TBili 10.5-> 5.7,     Interval history:  Breathing much more comfortably; reports mild left anterior chest pain is improved.. denies cough or hemoptysis; Hb 8.3 today  Denies cough  Saturating well on oxygen 3 l via N/C; currently afebrile    ROS:  As above    PAST MEDICAL & SURGICAL HISTORY:  Perthes disease, left  Sickle cell anemia  No significant past surgical history                MEDICATIONS  (STANDING):  azithromycin  IVPB 500 milliGRAM(s) IV Intermittent every 24 hours  cefTRIAXone   IVPB 1 Gram(s) IV Intermittent every 24 hours  enoxaparin Injectable 40 milliGRAM(s) SubCutaneous daily  folic acid 1 milliGRAM(s) Oral daily  lidocaine   Patch 1 Patch Transdermal every 24 hours  multiple electrolytes Injection Type 1 1000 milliLiter(s) (125 mL/Hr) IV Continuous <Continuous>  multivitamin 1 Tablet(s) Oral daily    MEDICATIONS  (PRN):  acetaminophen   Tablet .. 650 milliGRAM(s) Oral every 6 hours PRN Temp greater or equal to 38C (100.4F)  acetaminophen 300 mG/codeine 30 mG 1 Tablet(s) Oral every 4 hours PRN Moderate Pain (4 - 6)  morphine  - Injectable 2 milliGRAM(s) IV Push every 6 hours PRN Severe Pain (7 - 10)      Allergies    No Known Allergies    Intolerances        Vital Signs Last 24 Hrs  T(C): 37.7 (01 May 2019 07:32), Max: 38.3 (30 Apr 2019 16:33)  T(F): 99.8 (01 May 2019 07:32), Max: 101 (30 Apr 2019 16:33)  HR: 112 (01 May 2019 08:00) (106 - 130)  BP: 115/73 (01 May 2019 08:00) (109/69 - 137/85)  BP(mean): 89 (01 May 2019 08:00) (82 - 106)  RR: 31 (01 May 2019 08:00) (18 - 31)  SpO2: 95% (01 May 2019 08:00) (95% - 100%)    PHYSICAL EXAM  General: adult in NAD  HEENT: clear oropharynx, anicteric sclera, pink conjunctiva; scleral icterus  Neck: supple  CV: RRR normal S1S2  Lungs: positive air movement b/l ant lungs,clear to auscultation, no wheezes, no rales  Abdomen: soft non-tender non-distended, no hepatosplenomegaly  Ext: no clubbing cyanosis or edema  Skin: no rashes and no petechiae  Neuro: alert and oriented X 4, no focal deficits      LABS:  WBC:  18.4, H/H 8.3/25.6, plt ct 332,000  TB 2.8  WBC 28.4, H/H 9.1/27.8; plt ct 264  Retic 8.2                        8.8    24.5  )-----------( 305      ( 01 May 2019 05:11 )             26.4         Mean Cell Volume : 79.0 fl  Mean Cell Hemoglobin : 26.3 pg  Mean Cell Hemoglobin Concentration : 33.3 g/dL  Auto Neutrophil # : x  Auto Lymphocyte # : x  Auto Monocyte # : x  Auto Eosinophil # : x  Auto Basophil # : x  Auto Neutrophil % : x  Auto Lymphocyte % : x  Auto Monocyte % : x  Auto Eosinophil % : x  Auto Basophil % : x      Serial CBC's  05-01 @ 05:11  Hct-26.4 / Hgb-8.8 / Plat-305 / RBC-3.34 / WBC-24.5  Serial CBC's  04-30 @ 16:31  Hct-28.6 / Hgb-9.7 / Plat-336 / RBC-3.63 / WBC-26.8      05-01    137  |  104  |  4.0<L>  ----------------------------<  110  4.3   |  22.0  |  0.46<L>    Ca    10.2      01 May 2019 05:11    TPro  7.6  /  Alb  3.6  /  TBili  5.7<H>  /  DBili  x   /  AST  39  /  ALT  27  /  AlkPhos  168<H>  05-01      PT/INR - ( 30 Apr 2019 16:31 )   PT: 15.2 sec;   INR: 1.31 ratio         PTT - ( 30 Apr 2019 16:31 )  PTT:30.5 sec    Reticulocyte Percent: 9.6 % (05-01-19 @ 05:11)  Reticulocyte Percent: 11.3 % (04-30-19 @ 16:31)              RADIOLOGY & ADDITIONAL STUDIES:  EXAM:  CT ANGIO CHEST (W)AW IC                          PROCEDURE DATE:  04/30/2019          INTERPRETATION:  CLINICAL INFORMATION: Pleuritic chest pain. History of   sickle cell disease. Concern for acute chest syndrome. Evaluate for PE.    COMPARISON: Chest x-ray 4/30/2019    PROCEDURE:   CT Angiography of the Chest.  93 ml of Omnipaque 350 was injected intravenously.   Sagittal and coronal reformats were performed as well as 3D (MIP)   reconstructions.      FINDINGS:    CHEST:     LUNGS AND LARGE AIRWAYS: Patent central airways.  Large consolidation   involving the inferior aspect of the left lower lobe and lingula and   small consolidations in the right lower lobe and right middle lobe.  PLEURA: Small left pleural effusion.  VESSELS: Evaluation of the pulmonary arteries is limited secondary to the   timing of the contrast bolus. There is no main or lobar pulmonary   embolism.  HEART: Heart size is normal. No pericardial effusion.  MEDIASTINUM AND JALEN: No lymphadenopathy.  CHEST WALL AND LOWER NECK: Within normal limits.  VISUALIZED UPPER ABDOMEN: There is cholelithiasis. There is a calcified   structure at the posterior aspect of the left upper quadrant measuring   3.6 cm, likely atrophic/infarcted spleen.  BONES: A few H shaped vertebral bodies are noted consistent with the   history of sickle cell disease.    IMPRESSION:     No central pulmonary embolism.    Large consolidation in the left lower lobe and lingula and smaller   consolidations in the right lower and middle lobes with the differential   including acute chest syndrome, however correlation is also recommended   for pneumonia.

## 2019-05-03 NOTE — PROGRESS NOTE ADULT - ASSESSMENT
Pt is a 35 yo M who presented to the ED w/ dyspnea and L sided chest pain found to have b/l infiltrates concerning for Acute chest syndrome and underlying sickle cell crisis. No other PMH     Possible acute chest syndrome: Symptomatically better, though continues to have fevers and leukocytosis  -continue to monitor Hgb and retic count and LDH as recommended by hematology. Improved LDH, bilirubin  -continue IVF  -Continue Hydroxyurea and Folic acid  -continue analgesia, oxygen, Rocephin and azithromycin for possible pneumonia.  Would switch to PO once afebrile and resolution of leukocytosis. Blood culture negative    Sickle cell crisis: see above.   -hematology rec's appreciated.     Constipation   - Continue Metamucil    Likely had moderate PCM  - Refusing Ensure  - Continue MVI  - Nutrition consults        DVT ppx: Lovenox  C. difficile prophylaxis - Florastor

## 2019-05-03 NOTE — CHART NOTE - NSCHARTNOTEFT_GEN_A_CORE
FOLLOW UP  Patient denies any current subjective fever, sob, no abdominal pain;       ICU Vital Signs Last 24 Hrs  T(C): 37.1 (03 May 2019 01:29), Max: 38.6 (02 May 2019 22:47)  T(F): 98.7 (03 May 2019 01:29), Max: 101.4 (02 May 2019 22:47)  HR: 120 (02 May 2019 22:47) (90 - 120)  BP: 117/76 (02 May 2019 22:47) (110/73 - 122/82)  RR: 18 (02 May 2019 22:47) (18 - 20)  SpO2: 98% (02 May 2019 22:47) (98% - 100%)    general- adult male, laying in bed, nad  resp- positive crackles in right upper and lower lung;  nc =3liters;    cardio- rrr, no murmurs;    ext- no edema appreciated;       a/p  35y/o male with pmh sickle cell dz, admitted for suspected acute chest syndrome with sickle cell crisis, patient had known fever, tachycardia, leukocytosis during hospitalization, recently downgraded from icu.   Heme onc on board, started on hydroxyurea;     -pain control optimized;  no desaturations;  patient now afebrile;     -antibiotics spectrum widened with vanco/zosyn in the setting of chestxray with concerning right sided worsenign; clinically has crackles in right upper and lower lung; FOLLOW UP  Patient denies any current subjective fever, sob, chest pain has resolved;      ICU Vital Signs Last 24 Hrs  T(C): 37.1 (03 May 2019 01:29), Max: 38.6 (02 May 2019 22:47)  T(F): 98.7 (03 May 2019 01:29), Max: 101.4 (02 May 2019 22:47)  HR: 120 (02 May 2019 22:47) (90 - 120)  BP: 117/76 (02 May 2019 22:47) (110/73 - 122/82)  RR: 18 (02 May 2019 22:47) (18 - 20)  SpO2: 98% (02 May 2019 22:47) (98% - 100%)    general- adult male, laying in bed, nad  resp- positive crackles in right upper and lower lung;  nc =3liters;    cardio- rrr, no murmurs;    ext- no edema appreciated;       a/p  35y/o male with pmh sickle cell dz, admitted for suspected acute chest syndrome with sickle cell crisis, patient had known fever, tachycardia, leukocytosis during hospitalization, recently downgraded from icu.   Heme onc on board, started on hydroxyurea;     -pain control optimized;  no desaturations;  patient now afebrile;     -antibiotics spectrum widened with vanco/zosyn in the setting of chestxray with concerning right sided worsening; clinically has crackles in right upper and lower lung;

## 2019-05-03 NOTE — PROGRESS NOTE ADULT - ASSESSMENT
Sickle cell crisis with history of avascular necrosis of left hip in 4/2018   A/w fever, SOB, chest pain, all of which are improving.  No evidence of CV compromise  CT finding concerning for acute chest syndrome vs PNA; pt continue to improve clinically on current regimen  Currently afebrile, saturating well  WBC trending down; TB trending down    -IVF, antibiotics, supplement oxygen, analgesic  -on admission, discussed about Exchange transfusion, planned if  if clinical deterioration. Patient's mother stated that patient had severe complication with exchange transfusion when he was 1 and 1/2 year old. We then discussed about cross matched prbc transfusion. Discussed risk and benefit. They want to hold off for now. Clinically much improved; would continue treating for pneumonia; does not need exchange tx at present; will monitor closely  -Start hydrea 500 mg BID in attempt to decrease Hbg S production. Discussed with patient and his mother at bedside including risk and benefit. Cont Folic acid  -trend LDH, TB  and retic  -discussed with patient with pt  -Will follow. Please call if any question [phone]

## 2019-05-03 NOTE — PROGRESS NOTE ADULT - SUBJECTIVE AND OBJECTIVE BOX
HOSPITALIST PROGRESS NOTE    STEVIE DEAL  [id number]  34yMale    Patient is a 34y old  Male who presents with a chief complaint of Acute Chest Syndrome (02 May 2019 19:15)      SUBJECTIVE:   Chart reviewed since last visit.  Patient seen and examined at bedside for acute chest syndrome possible pneumonia.  Feel better, though has nocturnal fever.  Chest pain improved, denies any palpitations, dizziness, dyspnea, nausea, vomiting.      OBJECTIVE:  Vital Signs Last 24 Hrs  T(C): 36.9 (03 May 2019 11:00), Max: 38.6 (02 May 2019 22:47)  T(F): 98.5 (03 May 2019 11:00), Max: 101.4 (02 May 2019 22:47)  HR: 94 (03 May 2019 11:00) (85 - 122)  BP: 132/77 (03 May 2019 11:00) (106/71 - 132/77)   RR: 18 (03 May 2019 11:00) (18 - 18)  SpO2: 98% (03 May 2019 11:00) (97% - 100%)    PHYSICAL EXAMINATION  General: Lying in bed, NAD  HEENT: Mild icterus  NECK:  Supple  CVS: regular rate and rhythm S1 S2  RESP:  Decreased sounds LLL, Bibasilar fine crackles  GI:  Soft nondistended nontender BS+  : No suprapubic tenderness  MS:  FROM  CNS:  AAOx3, no focal deficit noted  INTEG:  Warm dry skin  PSYCH:  Fair mood      MONITOR:  CAPILLARY BLOOD GLUCOSE      POCT Blood Glucose.: 130 mg/dL (02 May 2019 23:08)        I&O's Summary    02 May 2019 07:01  -  03 May 2019 07:00  --------------------------------------------------------  IN: 2575 mL / OUT: 2200 mL / NET: 375 mL    03 May 2019 07:01  -  03 May 2019 14:09  --------------------------------------------------------  IN: 720 mL / OUT: 2 mL / NET: 718 mL                            8.3    18.4  )-----------( 332      ( 03 May 2019 11:52 )             25.6       05-03    136  |  102  |  5.0<L>  ----------------------------<  106  4.2   |  23.0  |  0.40<L>    Ca    9.7      03 May 2019 07:33    TPro  7.1  /  Alb  2.8<L>  /  TBili  2.8<H>  /  DBili  x   /  AST  32  /  ALT  20  /  AlkPhos  140<H>  05-03            Culture:  Culture - Blood (04.30.19 @ 17:01)    Specimen Source: .Blood    Culture Results:   No growth at 48 hours    Culture - Blood (04.30.19 @ 16:44)    Specimen Source: .Blood    Culture Results:   No growth at 48 hours    Culture - Urine (04.30.19 @ 17:37)    Specimen Source: .Urine    Culture Results:   No growth      TTE:    RADIOLOGY        MEDICATIONS  (STANDING):  enoxaparin Injectable 40 milliGRAM(s) SubCutaneous daily  folic acid 1 milliGRAM(s) Oral daily  hydroxyurea 500 milliGRAM(s) Oral two times a day  lidocaine   Patch 1 Patch Transdermal every 24 hours  morphine  - Injectable 2 milliGRAM(s) IV Push once  multiple electrolytes Injection Type 1 1000 milliLiter(s) (100 mL/Hr) IV Continuous <Continuous>  multivitamin 1 Tablet(s) Oral daily  piperacillin/tazobactam IVPB. 3.375 Gram(s) IV Intermittent every 8 hours  psyllium Powder 1 Packet(s) Oral two times a day  saccharomyces boulardii 250 milliGRAM(s) Oral two times a day  vancomycin  IVPB      vancomycin  IVPB 1250 milliGRAM(s) IV Intermittent every 8 hours      MEDICATIONS  (PRN):  acetaminophen   Tablet .. 650 milliGRAM(s) Oral every 6 hours PRN Temp greater or equal to 38C (100.4F)  acetaminophen 300 mG/codeine 30 mG 1 Tablet(s) Oral every 4 hours PRN Moderate Pain (4 - 6)  morphine  - Injectable 2 milliGRAM(s) IV Push every 6 hours PRN Severe Pain (7 - 10)

## 2019-05-04 LAB
HCT VFR BLD CALC: 26.3 % — LOW (ref 42–52)
HGB BLD-MCNC: 8.6 G/DL — LOW (ref 14–18)
LDH SERPL L TO P-CCNC: 384 U/L — HIGH (ref 98–192)
MCHC RBC-ENTMCNC: 26.2 PG — LOW (ref 27–31)
MCHC RBC-ENTMCNC: 32.7 G/DL — SIGNIFICANT CHANGE UP (ref 32–36)
MCV RBC AUTO: 80.2 FL — SIGNIFICANT CHANGE UP (ref 80–94)
PLATELET # BLD AUTO: 393 K/UL — SIGNIFICANT CHANGE UP (ref 150–400)
RBC # BLD: 3.28 M/UL — LOW (ref 4.6–6.2)
RBC # FLD: 22.5 % — HIGH (ref 11–15.6)
VANCOMYCIN TROUGH SERPL-MCNC: 9.3 UG/ML — LOW (ref 10–20)
WBC # BLD: 15 K/UL — HIGH (ref 4.8–10.8)
WBC # FLD AUTO: 15 K/UL — HIGH (ref 4.8–10.8)

## 2019-05-04 PROCEDURE — 99232 SBSQ HOSP IP/OBS MODERATE 35: CPT

## 2019-05-04 PROCEDURE — 99222 1ST HOSP IP/OBS MODERATE 55: CPT

## 2019-05-04 RX ORDER — AZITHROMYCIN 500 MG/1
500 TABLET, FILM COATED ORAL DAILY
Qty: 0 | Refills: 0 | Status: DISCONTINUED | OUTPATIENT
Start: 2019-05-04 | End: 2019-05-06

## 2019-05-04 RX ADMIN — Medication 1 TABLET(S): at 11:10

## 2019-05-04 RX ADMIN — PIPERACILLIN AND TAZOBACTAM 25 GRAM(S): 4; .5 INJECTION, POWDER, LYOPHILIZED, FOR SOLUTION INTRAVENOUS at 21:36

## 2019-05-04 RX ADMIN — HYDROXYUREA 500 MILLIGRAM(S): 500 CAPSULE ORAL at 19:49

## 2019-05-04 RX ADMIN — LIDOCAINE 1 PATCH: 4 CREAM TOPICAL at 21:36

## 2019-05-04 RX ADMIN — HYDROXYUREA 500 MILLIGRAM(S): 500 CAPSULE ORAL at 05:23

## 2019-05-04 RX ADMIN — AZITHROMYCIN 500 MILLIGRAM(S): 500 TABLET, FILM COATED ORAL at 15:32

## 2019-05-04 RX ADMIN — SODIUM CHLORIDE 100 MILLILITER(S): 9 INJECTION, SOLUTION INTRAVENOUS at 05:23

## 2019-05-04 RX ADMIN — MORPHINE SULFATE 2 MILLIGRAM(S): 50 CAPSULE, EXTENDED RELEASE ORAL at 21:52

## 2019-05-04 RX ADMIN — Medication 650 MILLIGRAM(S): at 22:36

## 2019-05-04 RX ADMIN — LIDOCAINE 1 PATCH: 4 CREAM TOPICAL at 11:45

## 2019-05-04 RX ADMIN — Medication 250 MILLIGRAM(S): at 05:23

## 2019-05-04 RX ADMIN — PIPERACILLIN AND TAZOBACTAM 25 GRAM(S): 4; .5 INJECTION, POWDER, LYOPHILIZED, FOR SOLUTION INTRAVENOUS at 13:00

## 2019-05-04 RX ADMIN — Medication 1 MILLIGRAM(S): at 12:53

## 2019-05-04 RX ADMIN — Medication 1 TABLET(S): at 12:53

## 2019-05-04 RX ADMIN — LIDOCAINE 1 PATCH: 4 CREAM TOPICAL at 08:32

## 2019-05-04 RX ADMIN — Medication 1 TABLET(S): at 20:50

## 2019-05-04 RX ADMIN — Medication 1 TABLET(S): at 15:41

## 2019-05-04 RX ADMIN — Medication 1 TABLET(S): at 19:48

## 2019-05-04 RX ADMIN — Medication 166.67 MILLIGRAM(S): at 08:49

## 2019-05-04 RX ADMIN — MORPHINE SULFATE 2 MILLIGRAM(S): 50 CAPSULE, EXTENDED RELEASE ORAL at 21:37

## 2019-05-04 RX ADMIN — Medication 1 TABLET(S): at 09:48

## 2019-05-04 RX ADMIN — Medication 650 MILLIGRAM(S): at 21:36

## 2019-05-04 RX ADMIN — PIPERACILLIN AND TAZOBACTAM 25 GRAM(S): 4; .5 INJECTION, POWDER, LYOPHILIZED, FOR SOLUTION INTRAVENOUS at 05:23

## 2019-05-04 RX ADMIN — Medication 1 TABLET(S): at 14:41

## 2019-05-04 NOTE — PROGRESS NOTE ADULT - SUBJECTIVE AND OBJECTIVE BOX
seen for acute chest syndrome.    minimal cough, sob improved.  minimal chest pain anterior chest/epigastrium   ros otherwise negative  states ambulating a bit and using incentive domenica (advised to use more )    MEDICATIONS  (STANDING):  azithromycin   Tablet 500 milliGRAM(s) Oral daily  enoxaparin Injectable 40 milliGRAM(s) SubCutaneous daily  folic acid 1 milliGRAM(s) Oral daily  hydroxyurea 500 milliGRAM(s) Oral two times a day  lidocaine   Patch 1 Patch Transdermal every 24 hours  morphine  - Injectable 2 milliGRAM(s) IV Push once  multivitamin 1 Tablet(s) Oral daily  piperacillin/tazobactam IVPB. 3.375 Gram(s) IV Intermittent every 8 hours  psyllium Powder 1 Packet(s) Oral two times a day  saccharomyces boulardii 250 milliGRAM(s) Oral two times a day    MEDICATIONS  (PRN):  acetaminophen   Tablet .. 650 milliGRAM(s) Oral every 6 hours PRN Temp greater or equal to 38C (100.4F)  acetaminophen 300 mG/codeine 30 mG 1 Tablet(s) Oral every 4 hours PRN Moderate Pain (4 - 6)  morphine  - Injectable 2 milliGRAM(s) IV Push every 6 hours PRN Severe Pain (7 - 10)      Allergies    No Known Allergies    Vital Signs Last 24 Hrs  T(C): 37.1 (04 May 2019 09:00), Max: 37.1 (03 May 2019 16:20)  T(F): 98.8 (04 May 2019 09:00), Max: 98.8 (04 May 2019 09:00)  HR: 106 (04 May 2019 09:00) (89 - 120)  BP: 117/77 (04 May 2019 09:00) (103/65 - 127/80)  BP(mean): --  RR: 18 (04 May 2019 09:00) (18 - 18)  SpO2: 96% (04 May 2019 09:00) (94% - 100%)    PHYSICAL EXAM:    GENERAL: NAD  CHEST/LUNG: diminished bs at bases R>L no wheezing   HEART: Regular rate and rhythm; S1 S2  ABDOMEN: Soft, Bowel sounds present  EXTREMITIES: no edema   NERVOUS SYSTEM:  Alert & Oriented X3, nonfocal  LABS:                        8.6    15.0  )-----------( 393      ( 04 May 2019 07:57 )             26.3     05-03    136  |  102  |  5.0<L>  ----------------------------<  106  4.2   |  23.0  |  0.40<L>    Ca    9.7      03 May 2019 07:33    TPro  7.1  /  Alb  2.8<L>  /  TBili  2.8<H>  /  DBili  x   /  AST  32  /  ALT  20  /  AlkPhos  140<H>  05-03          CAPILLARY BLOOD GLUCOSE            RADIOLOGY & ADDITIONAL TESTS:

## 2019-05-04 NOTE — CONSULT NOTE ADULT - ASSESSMENT
33 y/o man with PMH of Sickle cell disease was admitted with left-sided chest pain and shortness of breath for 2days PTA.   Chest CT showed a large LLL infiltration and RLL small opacity. Pneumonia is a stronger possibility than ACS, no ABG available. Since cultures are neg, will cover possible CAP.     Pneumonia vs acute chest syndrome  Sickle cell anemia    - Blood culture negative x 2 4/30  - RVP negative  - No sputum culture   - Chest CT with LLL infiltration   - Can stop zosyn and vancomycin  - Start Levaquin 750mg daily for 7days.   - Needs repeat imaging after discharge in few weeks to ensure about resolution of opacities.   - Follow up with hematology after discharge.     Will sign off please call with any question. 35 y/o man with PMH of Sickle cell disease was admitted with left-sided chest pain and shortness of breath for 2days PTA.   Chest CT showed a large LLL infiltration and RLL small opacity. Pneumonia is a stronger possibility than ACS, no ABG available. Since cultures are neg, will cover possible CAP.     Pneumonia vs acute chest syndrome  Sickle cell anemia    - Blood culture negative x 2 4/30  - RVP negative  - No sputum culture   - Chest CT with LLL infiltration   - Can stop vancomycin  - Start azihtromycin 500mg daily   - Continue zosyn 3.375gm q8h   - when ready for discharge switch to Levaquin 750mg daily to complete total 10days from start of ABx.   - Needs repeat imaging after discharge in few weeks to ensure about resolution of opacities.   - Follow up with hematology after discharge.     Will follow.     Will sign off please call with any question.

## 2019-05-04 NOTE — PROGRESS NOTE ADULT - ASSESSMENT
Sickle cell crisis with history of avascular necrosis of left hip in 4/2018   A/w fever, SOB, chest pain, all of which are improving.  No evidence of CV compromise  CT finding concerning for acute chest syndrome vs PNA; pt continue to improve clinically on current regimen  Currently afebrile, saturating well on RA  WBC trending down; TB trending down; Hb holding stable    -IVF, antibiotics, supplement oxygen, analgesic  -on admission, discussed about Exchange transfusion, planned if  if clinical deterioration. Patient's mother stated that patient had severe complication with exchange transfusion when he was 1 and 1/2 year old. We then discussed about cross matched prbc transfusion. Discussed risk and benefit. They want to hold off for now. Clinically much improved; would continue treating for pneumonia; does not need exchange tx at present; will monitor closely  -ID note appreciated; in agreement with plan  -Start hydrea 500 mg BID in attempt to decrease Hbg S production. Discussed with patient and his mother at bedside including risk and benefit. Cont Folic acid  -trend LDH, TB  and retic  -discussed with patient and with family  -Will follow. Please call if any question [phone]; to follow up with Dr. Beal's office post discharge

## 2019-05-04 NOTE — PROGRESS NOTE ADULT - SUBJECTIVE AND OBJECTIVE BOX
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 Beal in [facility]. Patient presented to ED with complaints of left-sided chest pain and SOB for the past two days. Patient states he had a sickle cell crisis about two weeks prior, took 2 Ibuprofen and Tylenol/Codeine, and the crisis resolved without needing to go to the hospital. However, pain recurred two days ago prior to presentation and was more severe than usual prompting him to seek medical attention. Denies fever or chills. Patient states his last crisis was early April 2018 when he was admitted to [facility] for workup, MRI revealed necrosis of hip with residual damage, however he was evaluated by orthopedic and was told he did not require surgery because he maintained full mobility of his hip. In ED, patient was found to be febrile to 101'F and tachycardic, leukocytosis WBC 26.8, retic 11.3%, lactate 1.3. Chest xray with concerns for dense LLL infiltrate vs pleural effusion. Patient was given 2L IV fluid bolus, as well as Azithromycin, Rocephin, Zosyn, and Vancomycin empirically for pneumonia.  Hbg 9.7-> 8.8, TBili 10.5-> 5.7,     Interval history:  Breathing much more comfortably; reports mild left anterior chest pain is resolved. denies cough or hemoptysis; Hb 8.6 today  Denies cough or sputum production  Saturating well now off  oxygen; currently afebrile    ROS:  As above    PAST MEDICAL & SURGICAL HISTORY:  Perthes disease, left  Sickle cell anemia  No significant past surgical history                MEDICATIONS  (STANDING):  azithromycin  IVPB 500 milliGRAM(s) IV Intermittent every 24 hours  cefTRIAXone   IVPB 1 Gram(s) IV Intermittent every 24 hours  enoxaparin Injectable 40 milliGRAM(s) SubCutaneous daily  folic acid 1 milliGRAM(s) Oral daily  lidocaine   Patch 1 Patch Transdermal every 24 hours  multiple electrolytes Injection Type 1 1000 milliLiter(s) (125 mL/Hr) IV Continuous <Continuous>  multivitamin 1 Tablet(s) Oral daily    MEDICATIONS  (PRN):  acetaminophen   Tablet .. 650 milliGRAM(s) Oral every 6 hours PRN Temp greater or equal to 38C (100.4F)  acetaminophen 300 mG/codeine 30 mG 1 Tablet(s) Oral every 4 hours PRN Moderate Pain (4 - 6)  morphine  - Injectable 2 milliGRAM(s) IV Push every 6 hours PRN Severe Pain (7 - 10)      Allergies    No Known Allergies    Intolerances        Vital Signs Last 24 Hrs  T(C): 37.7 (01 May 2019 07:32), Max: 38.3 (30 Apr 2019 16:33)  T(F): 99.8 (01 May 2019 07:32), Max: 101 (30 Apr 2019 16:33)  HR: 112 (01 May 2019 08:00) (106 - 130)  BP: 115/73 (01 May 2019 08:00) (109/69 - 137/85)  BP(mean): 89 (01 May 2019 08:00) (82 - 106)  RR: 31 (01 May 2019 08:00) (18 - 31)  SpO2: 95% (01 May 2019 08:00) (95% - 100%)    PHYSICAL EXAM  General: adult in NAD  HEENT: clear oropharynx, anicteric sclera, pink conjunctiva; scleral icterus  Neck: supple  CV: RRR normal S1S2  Lungs: positive air movement b/l ant lungs,clear to auscultation, no wheezes, no rales  Abdomen: soft non-tender non-distended, no hepatosplenomegaly  Ext: no clubbing cyanosis or edema  Skin: no rashes and no petechiae  Neuro: alert and oriented X 4, no focal deficits      LABS:  WBC:  15 , H/H 8.6/26.3; plt ct 393,000    WBC:  18.4, H/H 8.3/25.6, plt ct 332,000  TB 2.8  WBC 28.4, H/H 9.1/27.8; plt ct 264  Retic 8.2                        8.8    24.5  )-----------( 305      ( 01 May 2019 05:11 )             26.4         Mean Cell Volume : 79.0 fl  Mean Cell Hemoglobin : 26.3 pg  Mean Cell Hemoglobin Concentration : 33.3 g/dL  Auto Neutrophil # : x  Auto Lymphocyte # : x  Auto Monocyte # : x  Auto Eosinophil # : x  Auto Basophil # : x  Auto Neutrophil % : x  Auto Lymphocyte % : x  Auto Monocyte % : x  Auto Eosinophil % : x  Auto Basophil % : x      Serial CBC's  05-01 @ 05:11  Hct-26.4 / Hgb-8.8 / Plat-305 / RBC-3.34 / WBC-24.5  Serial CBC's  04-30 @ 16:31  Hct-28.6 / Hgb-9.7 / Plat-336 / RBC-3.63 / WBC-26.8      05-01    137  |  104  |  4.0<L>  ----------------------------<  110  4.3   |  22.0  |  0.46<L>    Ca    10.2      01 May 2019 05:11    TPro  7.6  /  Alb  3.6  /  TBili  5.7<H>  /  DBili  x   /  AST  39  /  ALT  27  /  AlkPhos  168<H>  05-01      PT/INR - ( 30 Apr 2019 16:31 )   PT: 15.2 sec;   INR: 1.31 ratio         PTT - ( 30 Apr 2019 16:31 )  PTT:30.5 sec    Reticulocyte Percent: 9.6 % (05-01-19 @ 05:11)  Reticulocyte Percent: 11.3 % (04-30-19 @ 16:31)              RADIOLOGY & ADDITIONAL STUDIES:  EXAM:  CT ANGIO CHEST (W)AW IC                          PROCEDURE DATE:  04/30/2019          INTERPRETATION:  CLINICAL INFORMATION: Pleuritic chest pain. History of   sickle cell disease. Concern for acute chest syndrome. Evaluate for PE.    COMPARISON: Chest x-ray 4/30/2019    PROCEDURE:   CT Angiography of the Chest.  93 ml of Omnipaque 350 was injected intravenously.   Sagittal and coronal reformats were performed as well as 3D (MIP)   reconstructions.      FINDINGS:    CHEST:     LUNGS AND LARGE AIRWAYS: Patent central airways.  Large consolidation   involving the inferior aspect of the left lower lobe and lingula and   small consolidations in the right lower lobe and right middle lobe.  PLEURA: Small left pleural effusion.  VESSELS: Evaluation of the pulmonary arteries is limited secondary to the   timing of the contrast bolus. There is no main or lobar pulmonary   embolism.  HEART: Heart size is normal. No pericardial effusion.  MEDIASTINUM AND JALEN: No lymphadenopathy.  CHEST WALL AND LOWER NECK: Within normal limits.  VISUALIZED UPPER ABDOMEN: There is cholelithiasis. There is a calcified   structure at the posterior aspect of the left upper quadrant measuring   3.6 cm, likely atrophic/infarcted spleen.  BONES: A few H shaped vertebral bodies are noted consistent with the   history of sickle cell disease.    IMPRESSION:     No central pulmonary embolism.    Large consolidation in the left lower lobe and lingula and smaller   consolidations in the right lower and middle lobes with the differential   including acute chest syndrome, however correlation is also recommended   for pneumonia.

## 2019-05-04 NOTE — CONSULT NOTE ADULT - SUBJECTIVE AND OBJECTIVE BOX
[facility]  INFECTIOUS DISEASES AND INTERNAL MEDICINE at [place]  =======================================================  Demian Galindo MD  Diplomates American Board of Internal Medicine and Infectious Diseases  =======================================================    [id number]  STEVIE ALVARADOKY     CC: Pneumonia vs acute chest syndrome     HPI:  33 y/o man with PMH of Sickle cell disease was admitted with left-sided chest pain and shortness of breath for 2days PTA.   Chest CT showed a large LLL infiltration and RLL small opacity. He has been started on antibiotics since 4/30, currently zosyn and vancomycin and ID was called for further recommendation.   he has no cough, SOB has improved. +pleuritic chest pain in left lower chest that is improving.      PAST MEDICAL & SURGICAL HISTORY:  Perthes disease, left  Sickle cell anemia  No significant past surgical history    Allergies  No Known Allergies    Antibiotics:  piperacillin/tazobactam IVPB. 3.375 Gram(s) IV Intermittent every 8 hours  vancomycin  IVPB 1250 milliGRAM(s) IV Intermittent every 8 hours    REVIEW OF SYSTEMS:  CONSTITUTIONAL:  No Fever or chills  HEENT:  No diplopia or blurred vision.  No sore throat or runny nose.  CARDIOVASCULAR:  No chest pain or SOB.  RESPIRATORY:  No cough, shortness of breath, PND or orthopnea. mild pleuritic chest pain   GASTROINTESTINAL:  No nausea, vomiting or diarrhea.  GENITOURINARY:  No dysuria, frequency or urgency. No Blood in urine  MUSCULOSKELETAL:  no joint aches, no muscle pain  SKIN:  No change in skin, hair or nails.  NEUROLOGIC:  No paresthesias, fasciculations, seizures or weakness.  PSYCHIATRIC:  No disorder of thought or mood.  ENDOCRINE:  No heat or cold intolerance, polyuria or polydipsia.  HEMATOLOGICAL:  No easy bruising or bleeding.     Physical Exam:  Vital Signs Last 24 Hrs  T(C): 37.1 (04 May 2019 09:00), Max: 37.1 (03 May 2019 16:20)  T(F): 98.8 (04 May 2019 09:00), Max: 98.8 (04 May 2019 09:00)  HR: 106 (04 May 2019 09:00) (89 - 120)  BP: 117/77 (04 May 2019 09:00) (103/65 - 132/77)  RR: 18 (04 May 2019 09:00) (18 - 18)  SpO2: 96% (04 May 2019 09:00) (94% - 100%)  GEN: NAD  HEENT: normocephalic and atraumatic. EOMI. PERRL.    NECK: Supple.  No lymphadenopathy   LUNGS: lower lobes with crackles   HEART: Regular rate and rhythm without murmur.  ABDOMEN: Soft, nontender, and nondistended.  Positive bowel sounds.    : No CVA tenderness  EXTREMITIES: Without any cyanosis, clubbing, rash, lesions or edema.  NEUROLOGIC: grossly intact.  PSYCHIATRIC: Appropriate affect .  SKIN: No ulceration or induration present.    Labs:  05-03    136  |  102  |  5.0<L>  ----------------------------<  106  4.2   |  23.0  |  0.40<L>    Ca    9.7      03 May 2019 07:33    TPro  7.1  /  Alb  2.8<L>  /  TBili  2.8<H>  /  DBili  x   /  AST  32  /  ALT  20  /  AlkPhos  140<H>  05-03                     8.6    15.0  )-----------( 393      ( 04 May 2019 07:57 )             26.3     LIVER FUNCTIONS - ( 03 May 2019 07:33 )  Alb: 2.8 g/dL / Pro: 7.1 g/dL / ALK PHOS: 140 U/L / ALT: 20 U/L / AST: 32 U/L / GGT: x           RECENT CULTURES:  04-30 @ 19:49      NotDetec    04-30 @ 17:37 .Urine     No growth    04-30 @ 17:01 .Blood     No growth at 48 hours    04-30 @ 16:44 .Blood     No growth at 48 hours    All imaging and other data have been reviewed.

## 2019-05-04 NOTE — PROGRESS NOTE ADULT - ASSESSMENT
33 yo M who presented to the ED w/ dyspnea and L sided chest pain found to have b/l infiltrates concerning for Acute chest syndrome and underlying sickle cell crisis.     acute chest syndrome     cultures negative,      ID consulted--advised azithromycin and zosyn. no evidence of MRSA         can dc on levaquin to complete 10 days    CXR with congestion--dc IV fluids    sickle cell crisis and anemia     heme following      c/w hydroxyurea.    dc planning    mother wants to take him home soon      advised need for ambulation, and evaluate for need for o2

## 2019-05-05 LAB
ANISOCYTOSIS BLD QL: SLIGHT — SIGNIFICANT CHANGE UP
BASOPHILS # BLD AUTO: 0.1 K/UL — SIGNIFICANT CHANGE UP (ref 0–0.2)
BASOPHILS NFR BLD AUTO: 0 % — SIGNIFICANT CHANGE UP (ref 0–2)
BURR CELLS BLD QL SMEAR: SIGNIFICANT CHANGE UP
CULTURE RESULTS: SIGNIFICANT CHANGE UP
CULTURE RESULTS: SIGNIFICANT CHANGE UP
ELLIPTOCYTES BLD QL SMEAR: SLIGHT — SIGNIFICANT CHANGE UP
EOSINOPHIL # BLD AUTO: 0.4 K/UL — SIGNIFICANT CHANGE UP (ref 0–0.5)
EOSINOPHIL NFR BLD AUTO: 2 % — SIGNIFICANT CHANGE UP (ref 0–6)
HCT VFR BLD CALC: 25.9 % — LOW (ref 42–52)
HGB BLD-MCNC: 8.4 G/DL — LOW (ref 14–18)
HYPOCHROMIA BLD QL: SIGNIFICANT CHANGE UP
LYMPHOCYTES # BLD AUTO: 15 % — LOW (ref 20–55)
LYMPHOCYTES # BLD AUTO: 3 K/UL — SIGNIFICANT CHANGE UP (ref 1–4.8)
MCHC RBC-ENTMCNC: 26.1 PG — LOW (ref 27–31)
MCHC RBC-ENTMCNC: 32.4 G/DL — SIGNIFICANT CHANGE UP (ref 32–36)
MCV RBC AUTO: 80.4 FL — SIGNIFICANT CHANGE UP (ref 80–94)
MONOCYTES # BLD AUTO: 3.2 K/UL — HIGH (ref 0–0.8)
MONOCYTES NFR BLD AUTO: 18 % — HIGH (ref 3–10)
NEUTROPHILS # BLD AUTO: 10.1 K/UL — HIGH (ref 1.8–8)
NEUTROPHILS NFR BLD AUTO: 65 % — SIGNIFICANT CHANGE UP (ref 37–73)
OVALOCYTES BLD QL SMEAR: SLIGHT — SIGNIFICANT CHANGE UP
PLAT MORPH BLD: NORMAL — SIGNIFICANT CHANGE UP
PLATELET # BLD AUTO: 394 K/UL — SIGNIFICANT CHANGE UP (ref 150–400)
POIKILOCYTOSIS BLD QL AUTO: SLIGHT — SIGNIFICANT CHANGE UP
RBC # BLD: 3.22 M/UL — LOW (ref 4.6–6.2)
RBC # FLD: 22.7 % — HIGH (ref 11–15.6)
RBC BLD AUTO: ABNORMAL
SCHISTOCYTES BLD QL AUTO: SLIGHT — SIGNIFICANT CHANGE UP
SICKLE CELLS BLD QL SMEAR: SIGNIFICANT CHANGE UP
SPECIMEN SOURCE: SIGNIFICANT CHANGE UP
SPECIMEN SOURCE: SIGNIFICANT CHANGE UP
TARGETS BLD QL SMEAR: SIGNIFICANT CHANGE UP
WBC # BLD: 17.6 K/UL — HIGH (ref 4.8–10.8)
WBC # FLD AUTO: 17.6 K/UL — HIGH (ref 4.8–10.8)

## 2019-05-05 PROCEDURE — 99232 SBSQ HOSP IP/OBS MODERATE 35: CPT

## 2019-05-05 RX ORDER — FUROSEMIDE 40 MG
20 TABLET ORAL ONCE
Qty: 0 | Refills: 0 | Status: DISCONTINUED | OUTPATIENT
Start: 2019-05-05 | End: 2019-05-05

## 2019-05-05 RX ADMIN — Medication 1 TABLET(S): at 00:46

## 2019-05-05 RX ADMIN — Medication 1 MILLIGRAM(S): at 13:27

## 2019-05-05 RX ADMIN — Medication 1 TABLET(S): at 21:40

## 2019-05-05 RX ADMIN — Medication 1 TABLET(S): at 09:39

## 2019-05-05 RX ADMIN — PIPERACILLIN AND TAZOBACTAM 25 GRAM(S): 4; .5 INJECTION, POWDER, LYOPHILIZED, FOR SOLUTION INTRAVENOUS at 13:26

## 2019-05-05 RX ADMIN — Medication 1 TABLET(S): at 20:57

## 2019-05-05 RX ADMIN — HYDROXYUREA 500 MILLIGRAM(S): 500 CAPSULE ORAL at 20:54

## 2019-05-05 RX ADMIN — Medication 1 TABLET(S): at 14:26

## 2019-05-05 RX ADMIN — Medication 1 TABLET(S): at 13:27

## 2019-05-05 RX ADMIN — Medication 1 TABLET(S): at 01:46

## 2019-05-05 RX ADMIN — LIDOCAINE 1 PATCH: 4 CREAM TOPICAL at 08:40

## 2019-05-05 RX ADMIN — PIPERACILLIN AND TAZOBACTAM 25 GRAM(S): 4; .5 INJECTION, POWDER, LYOPHILIZED, FOR SOLUTION INTRAVENOUS at 21:02

## 2019-05-05 RX ADMIN — HYDROXYUREA 500 MILLIGRAM(S): 500 CAPSULE ORAL at 05:20

## 2019-05-05 RX ADMIN — PIPERACILLIN AND TAZOBACTAM 25 GRAM(S): 4; .5 INJECTION, POWDER, LYOPHILIZED, FOR SOLUTION INTRAVENOUS at 05:20

## 2019-05-05 RX ADMIN — Medication 1 TABLET(S): at 13:26

## 2019-05-05 RX ADMIN — Medication 1 TABLET(S): at 08:39

## 2019-05-05 RX ADMIN — AZITHROMYCIN 500 MILLIGRAM(S): 500 TABLET, FILM COATED ORAL at 13:27

## 2019-05-05 RX ADMIN — LIDOCAINE 1 PATCH: 4 CREAM TOPICAL at 20:57

## 2019-05-05 NOTE — PROGRESS NOTE ADULT - SUBJECTIVE AND OBJECTIVE BOX
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 Beal in [facility]. Patient presented to ED with complaints of left-sided chest pain and SOB for the past two days. Patient states he had a sickle cell crisis about two weeks prior, took 2 Ibuprofen and Tylenol/Codeine, and the crisis resolved without needing to go to the hospital. However, pain recurred two days ago prior to presentation and was more severe than usual prompting him to seek medical attention. Denies fever or chills. Patient states his last crisis was early April 2018 when he was admitted to [facility] for workup, MRI revealed necrosis of hip with residual damage, however he was evaluated by orthopedic and was told he did not require surgery because he maintained full mobility of his hip. In ED, patient was found to be febrile to 101'F and tachycardic, leukocytosis WBC 26.8, retic 11.3%, lactate 1.3. Chest xray with concerns for dense LLL infiltrate vs pleural effusion. Patient was given 2L IV fluid bolus, as well as Azithromycin, Rocephin, Zosyn, and Vancomycin empirically for pneumonia.  Hbg 9.7-> 8.8, TBili 10.5-> 5.7,     Interval history:  Breathing much more comfortably; reports mild left anterior chest pain is resolved. denies cough or hemoptysis; Hb 8.4 today  Denies cough or sputum production  Saturating well now off  oxygen at rest; desturated when walking; remains afebrile    ROS:  As above    PAST MEDICAL & SURGICAL HISTORY:  Perthes disease, left  Sickle cell anemia  No significant past surgical history                MEDICATIONS  (STANDING):  azithromycin  IVPB 500 milliGRAM(s) IV Intermittent every 24 hours  cefTRIAXone   IVPB 1 Gram(s) IV Intermittent every 24 hours  enoxaparin Injectable 40 milliGRAM(s) SubCutaneous daily  folic acid 1 milliGRAM(s) Oral daily  lidocaine   Patch 1 Patch Transdermal every 24 hours  multiple electrolytes Injection Type 1 1000 milliLiter(s) (125 mL/Hr) IV Continuous <Continuous>  multivitamin 1 Tablet(s) Oral daily    MEDICATIONS  (PRN):  acetaminophen   Tablet .. 650 milliGRAM(s) Oral every 6 hours PRN Temp greater or equal to 38C (100.4F)  acetaminophen 300 mG/codeine 30 mG 1 Tablet(s) Oral every 4 hours PRN Moderate Pain (4 - 6)  morphine  - Injectable 2 milliGRAM(s) IV Push every 6 hours PRN Severe Pain (7 - 10)      Allergies    No Known Allergies    Intolerances        Vital Signs Last 24 Hrs  T(C): 37.7 (01 May 2019 07:32), Max: 38.3 (30 Apr 2019 16:33)  T(F): 99.8 (01 May 2019 07:32), Max: 101 (30 Apr 2019 16:33)  HR: 112 (01 May 2019 08:00) (106 - 130)  BP: 115/73 (01 May 2019 08:00) (109/69 - 137/85)  BP(mean): 89 (01 May 2019 08:00) (82 - 106)  RR: 31 (01 May 2019 08:00) (18 - 31)  SpO2: 95% (01 May 2019 08:00) (95% - 100%)    PHYSICAL EXAM  General: adult in NAD  HEENT: clear oropharynx, anicteric sclera, pink conjunctiva; scleral icterus  Neck: supple  CV: RRR normal S1S2  Lungs: positive air movement b/l ant lungs,clear to auscultation, no wheezes, no rales  Abdomen: soft non-tender non-distended, no hepatosplenomegaly  Ext: no clubbing cyanosis or edema  Skin: no rashes and no petechiae  Neuro: alert and oriented X 4, no focal deficits      LABS:  WBC:  17.6, H/H 8.4/25.9, plt ct 314,000 (5/5/19)  WBC:  15 , H/H 8.6/26.3; plt ct 393,000    WBC:  18.4, H/H 8.3/25.6, plt ct 332,000  TB 2.8  WBC 28.4, H/H 9.1/27.8; plt ct 264  Retic 8.2                        8.8    24.5  )-----------( 305      ( 01 May 2019 05:11 )             26.4         Mean Cell Volume : 79.0 fl  Mean Cell Hemoglobin : 26.3 pg  Mean Cell Hemoglobin Concentration : 33.3 g/dL  Auto Neutrophil # : x  Auto Lymphocyte # : x  Auto Monocyte # : x  Auto Eosinophil # : x  Auto Basophil # : x  Auto Neutrophil % : x  Auto Lymphocyte % : x  Auto Monocyte % : x  Auto Eosinophil % : x  Auto Basophil % : x      Serial CBC's  05-01 @ 05:11  Hct-26.4 / Hgb-8.8 / Plat-305 / RBC-3.34 / WBC-24.5  Serial CBC's  04-30 @ 16:31  Hct-28.6 / Hgb-9.7 / Plat-336 / RBC-3.63 / WBC-26.8      05-01    137  |  104  |  4.0<L>  ----------------------------<  110  4.3   |  22.0  |  0.46<L>    Ca    10.2      01 May 2019 05:11    TPro  7.6  /  Alb  3.6  /  TBili  5.7<H>  /  DBili  x   /  AST  39  /  ALT  27  /  AlkPhos  168<H>  05-01      PT/INR - ( 30 Apr 2019 16:31 )   PT: 15.2 sec;   INR: 1.31 ratio         PTT - ( 30 Apr 2019 16:31 )  PTT:30.5 sec    Reticulocyte Percent: 9.6 % (05-01-19 @ 05:11)  Reticulocyte Percent: 11.3 % (04-30-19 @ 16:31)              RADIOLOGY & ADDITIONAL STUDIES:  EXAM:  CT ANGIO CHEST (W)AW IC                          PROCEDURE DATE:  04/30/2019          INTERPRETATION:  CLINICAL INFORMATION: Pleuritic chest pain. History of   sickle cell disease. Concern for acute chest syndrome. Evaluate for PE.    COMPARISON: Chest x-ray 4/30/2019    PROCEDURE:   CT Angiography of the Chest.  93 ml of Omnipaque 350 was injected intravenously.   Sagittal and coronal reformats were performed as well as 3D (MIP)   reconstructions.      FINDINGS:    CHEST:     LUNGS AND LARGE AIRWAYS: Patent central airways.  Large consolidation   involving the inferior aspect of the left lower lobe and lingula and   small consolidations in the right lower lobe and right middle lobe.  PLEURA: Small left pleural effusion.  VESSELS: Evaluation of the pulmonary arteries is limited secondary to the   timing of the contrast bolus. There is no main or lobar pulmonary   embolism.  HEART: Heart size is normal. No pericardial effusion.  MEDIASTINUM AND JALEN: No lymphadenopathy.  CHEST WALL AND LOWER NECK: Within normal limits.  VISUALIZED UPPER ABDOMEN: There is cholelithiasis. There is a calcified   structure at the posterior aspect of the left upper quadrant measuring   3.6 cm, likely atrophic/infarcted spleen.  BONES: A few H shaped vertebral bodies are noted consistent with the   history of sickle cell disease.    IMPRESSION:     No central pulmonary embolism.    Large consolidation in the left lower lobe and lingula and smaller   consolidations in the right lower and middle lobes with the differential   including acute chest syndrome, however correlation is also recommended   for pneumonia.

## 2019-05-05 NOTE — PROGRESS NOTE ADULT - ASSESSMENT
33 yo M who presented to the ED w/ dyspnea and L sided chest pain found to have b/l infiltrates concerning for Acute chest syndrome and underlying sickle cell crisis.     acute chest syndrome and LLL pneumonia (gram negative)     cultures negative,      ID consulted--advised azithromycin and zosyn. no evidence of MRSA         can dc on levaquin to complete 10 days    sickle cell crisis and anemia     heme following      c/w hydroxyurea.    dc planning---may need home o2     advised mother and patient at bedside re desaturation (they state "its normal")      AMA offered or we can re evaluate home o2 need in am and if qualifies will dc on home o2.     they are in agreement.

## 2019-05-05 NOTE — PROGRESS NOTE ADULT - SUBJECTIVE AND OBJECTIVE BOX
seen for pneumonia    desaturates on ambulation to 84% on RA.  feels well  no chest pain, minimal cough  ros negative  wants to go home     MEDICATIONS  (STANDING):  azithromycin   Tablet 500 milliGRAM(s) Oral daily  enoxaparin Injectable 40 milliGRAM(s) SubCutaneous daily  folic acid 1 milliGRAM(s) Oral daily  furosemide   Injectable 20 milliGRAM(s) IV Push once  hydroxyurea 500 milliGRAM(s) Oral two times a day  lidocaine   Patch 1 Patch Transdermal every 24 hours  morphine  - Injectable 2 milliGRAM(s) IV Push once  multivitamin 1 Tablet(s) Oral daily  piperacillin/tazobactam IVPB. 3.375 Gram(s) IV Intermittent every 8 hours  psyllium Powder 1 Packet(s) Oral two times a day  saccharomyces boulardii 250 milliGRAM(s) Oral two times a day    MEDICATIONS  (PRN):  acetaminophen   Tablet .. 650 milliGRAM(s) Oral every 6 hours PRN Temp greater or equal to 38C (100.4F)  acetaminophen 300 mG/codeine 30 mG 1 Tablet(s) Oral every 4 hours PRN Moderate Pain (4 - 6)  morphine  - Injectable 2 milliGRAM(s) IV Push every 6 hours PRN Severe Pain (7 - 10)      Allergies    No Known Allergies        Vital Signs Last 24 Hrs  T(C): 37.4 (05 May 2019 11:00), Max: 38.2 (04 May 2019 21:31)  T(F): 99.4 (05 May 2019 11:00), Max: 100.7 (04 May 2019 21:31)  HR: 117 (05 May 2019 11:00) (110 - 117)  BP: 120/82 (05 May 2019 11:00) (116/78 - 122/74)  BP(mean): --  RR: 18 (05 May 2019 11:00) (18 - 93)  SpO2: 92% (05 May 2019 11:45) (84% - 95%)    PHYSICAL EXAM:    GENERAL: NAD  CHEST/LUNG:  dec bs left bases no wheezing   HEART: Regular rate and rhythm; S1 S2  ABDOMEN: Soft,; Bowel sounds present  EXTREMITIES:  no edema   NERVOUS SYSTEM:  Alert & Oriented X3 nonfocal    LABS:                        8.4    17.6  )-----------( 394      ( 05 May 2019 07:49 )             25.9                 CAPILLARY BLOOD GLUCOSE            RADIOLOGY & ADDITIONAL TESTS:

## 2019-05-05 NOTE — PROGRESS NOTE ADULT - ASSESSMENT
Sickle cell crisis with history of avascular necrosis of left hip in 4/2018   A/w fever, SOB, chest pain, all of which are improving.  No evidence of CV compromise  CT finding concerning for acute chest syndrome vs PNA; pt continue to improve clinically on current regimen  Currently afebrile, saturating well on RA at rest; desaturatesd on walking/washing up  WBC trending down; TB trending down; Hb holding stable    -IVF, antibiotics, supplement oxygen, analgesic  -on admission, discussed about Exchange transfusion, planned if  if clinical deterioration. Patient's mother stated that patient had severe complication with exchange transfusion when he was 1 and 1/2 year old. We then discussed about cross matched prbc transfusion. Discussed risk and benefit. They want to hold off for now. Clinically much improved; would continue treating for pneumonia; does not need exchange tx at present; will monitor closely  -ID note appreciated; in agreement with plan; cont IV antibiotics  -Start hydrea 500 mg BID in attempt to decrease Hbg S production. Discussed with patient and his mother at bedside including risk and benefit. Cont Folic acid  -trend LDH, TB  and retic  -discussed with patient and with family; I cautioned pt about premature discharge; recommend not being discharged if continues to desaturate with walking  -Will follow. Please call if any question [phone]; to follow up with Dr. Beal's office post discharge

## 2019-05-05 NOTE — PROGRESS NOTE ADULT - ASSESSMENT
33 y/o man with PMH of Sickle cell disease was admitted with left-sided chest pain and shortness of breath for 2days PTA.   Chest CT showed a large LLL infiltration and RLL small opacity. Pneumonia is a stronger possibility. Seems comfortable off O2.    Pneumonia vs acute chest syndrome  Sickle cell anemia    - Blood culture negative x 2 on 4/30  - RVP negative  - No sputum culture   - Chest CT with LLL infiltration   - Continue azithromycin 500mg daily   - Continue zosyn 3.375gm q8h   - when ready for discharge switch to Levaquin 750mg daily to complete total 10days from start of ABx.   - Needs repeat imaging after discharge in few weeks to ensure about resolution of opacities.   - Follow up with hematology after discharge.     Will follow.

## 2019-05-05 NOTE — PROGRESS NOTE ADULT - SUBJECTIVE AND OBJECTIVE BOX
[facility]  INFECTIOUS DISEASES AND INTERNAL MEDICINE at [place]  =======================================================  Demian Galindo MD  Diplomates American Board of Internal Medicine and Infectious Diseases  =======================================================    [id number]  STEVIE DEAL     Follow up: Pneumonia vs acute chest syndrome   Clinically has improved but since still has desaturation, remains in the hospital.   No fever.     PAST MEDICAL & SURGICAL HISTORY:  Perthes disease, left  Sickle cell anemia  No significant past surgical history    Allergies  No Known Allergies    Antibiotics:  piperacillin/tazobactam IVPB. 3.375 Gram(s) IV Intermittent every 8 hours  Azithromycin     REVIEW OF SYSTEMS:  CONSTITUTIONAL:  No Fever or chills  HEENT:  No diplopia or blurred vision.  No sore throat or runny nose.  CARDIOVASCULAR:  No chest pain or SOB.  RESPIRATORY:  No cough, shortness of breath, PND or orthopnea. mild pleuritic chest pain   GASTROINTESTINAL:  No nausea, vomiting or diarrhea.  GENITOURINARY:  No dysuria, frequency or urgency. No Blood in urine  MUSCULOSKELETAL:  no joint aches, no muscle pain  SKIN:  No change in skin, hair or nails.  NEUROLOGIC:  No paresthesias, fasciculations, seizures or weakness.  PSYCHIATRIC:  No disorder of thought or mood.  ENDOCRINE:  No heat or cold intolerance, polyuria or polydipsia.  HEMATOLOGICAL:  No easy bruising or bleeding.     Physical Exam:  Vital Signs Last 24 Hrs  T(C): 37.2 (05 May 2019 11:45), Max: 38.2 (04 May 2019 21:31)  T(F): 99 (05 May 2019 11:45), Max: 100.7 (04 May 2019 21:31)  HR: 117 (05 May 2019 11:00) (110 - 117)  BP: 120/82 (05 May 2019 11:00) (116/78 - 122/74)  RR: 18 (05 May 2019 11:00) (18 - 93)  SpO2: 92% (05 May 2019 11:45) (84% - 95%)  GEN: NAD, off any O2 supplement  HEENT: normocephalic and atraumatic. EOMI. PERRL.    NECK: Supple.  No lymphadenopathy   LUNGS: lower lobes with crackles   HEART: Regular rate and rhythm without murmur.  ABDOMEN: Soft, nontender, and nondistended.  Positive bowel sounds.    : No CVA tenderness  EXTREMITIES: Without any cyanosis, clubbing, rash, lesions or edema.  NEUROLOGIC: grossly intact.  PSYCHIATRIC: Appropriate affect .  SKIN: No ulceration or induration present.    Labs:                      8.4    17.6  )-----------( 394      ( 05 May 2019 07:49 )             25.9     RECENT CULTURES:  04-30 @ 19:49      NotDetec    04-30 @ 17:37 .Urine     No growth    04-30 @ 17:01 .Blood     No growth at 48 hours    04-30 @ 16:44 .Blood     No growth at 48 hours    All imaging and other data have been reviewed.

## 2019-05-06 ENCOUNTER — TRANSCRIPTION ENCOUNTER (OUTPATIENT)
Age: 34
End: 2019-05-06

## 2019-05-06 VITALS
HEART RATE: 118 BPM | OXYGEN SATURATION: 93 % | TEMPERATURE: 97 F | RESPIRATION RATE: 18 BRPM | SYSTOLIC BLOOD PRESSURE: 128 MMHG | DIASTOLIC BLOOD PRESSURE: 81 MMHG

## 2019-05-06 LAB
HCT VFR BLD CALC: 26.1 % — LOW (ref 42–52)
HGB BLD-MCNC: 9 G/DL — LOW (ref 14–18)
MCHC RBC-ENTMCNC: 27.8 PG — SIGNIFICANT CHANGE UP (ref 27–31)
MCHC RBC-ENTMCNC: 34.5 G/DL — SIGNIFICANT CHANGE UP (ref 32–36)
MCV RBC AUTO: 80.6 FL — SIGNIFICANT CHANGE UP (ref 80–94)
NT-PROBNP SERPL-SCNC: 17 PG/ML — SIGNIFICANT CHANGE UP (ref 0–300)
PLATELET # BLD AUTO: 423 K/UL — HIGH (ref 150–400)
RBC # BLD: 3.24 M/UL — LOW (ref 4.6–6.2)
RBC # FLD: 23.9 % — HIGH (ref 11–15.6)
WBC # BLD: 15.7 K/UL — HIGH (ref 4.8–10.8)
WBC # FLD AUTO: 15.7 K/UL — HIGH (ref 4.8–10.8)

## 2019-05-06 PROCEDURE — 87633 RESP VIRUS 12-25 TARGETS: CPT

## 2019-05-06 PROCEDURE — 82803 BLOOD GASES ANY COMBINATION: CPT

## 2019-05-06 PROCEDURE — 85027 COMPLETE CBC AUTOMATED: CPT

## 2019-05-06 PROCEDURE — 81001 URINALYSIS AUTO W/SCOPE: CPT

## 2019-05-06 PROCEDURE — 87040 BLOOD CULTURE FOR BACTERIA: CPT

## 2019-05-06 PROCEDURE — 87086 URINE CULTURE/COLONY COUNT: CPT

## 2019-05-06 PROCEDURE — 83605 ASSAY OF LACTIC ACID: CPT

## 2019-05-06 PROCEDURE — 99232 SBSQ HOSP IP/OBS MODERATE 35: CPT

## 2019-05-06 PROCEDURE — 80053 COMPREHEN METABOLIC PANEL: CPT

## 2019-05-06 PROCEDURE — 82435 ASSAY OF BLOOD CHLORIDE: CPT

## 2019-05-06 PROCEDURE — 82947 ASSAY GLUCOSE BLOOD QUANT: CPT

## 2019-05-06 PROCEDURE — 71046 X-RAY EXAM CHEST 2 VIEWS: CPT | Mod: 26

## 2019-05-06 PROCEDURE — 80202 ASSAY OF VANCOMYCIN: CPT

## 2019-05-06 PROCEDURE — 82330 ASSAY OF CALCIUM: CPT

## 2019-05-06 PROCEDURE — 82962 GLUCOSE BLOOD TEST: CPT

## 2019-05-06 PROCEDURE — 84295 ASSAY OF SERUM SODIUM: CPT

## 2019-05-06 PROCEDURE — 80048 BASIC METABOLIC PNL TOTAL CA: CPT

## 2019-05-06 PROCEDURE — 84132 ASSAY OF SERUM POTASSIUM: CPT

## 2019-05-06 PROCEDURE — 93005 ELECTROCARDIOGRAM TRACING: CPT

## 2019-05-06 PROCEDURE — 71045 X-RAY EXAM CHEST 1 VIEW: CPT

## 2019-05-06 PROCEDURE — 96375 TX/PRO/DX INJ NEW DRUG ADDON: CPT | Mod: XU

## 2019-05-06 PROCEDURE — 87798 DETECT AGENT NOS DNA AMP: CPT

## 2019-05-06 PROCEDURE — 36415 COLL VENOUS BLD VENIPUNCTURE: CPT

## 2019-05-06 PROCEDURE — 82550 ASSAY OF CK (CPK): CPT

## 2019-05-06 PROCEDURE — 83880 ASSAY OF NATRIURETIC PEPTIDE: CPT

## 2019-05-06 PROCEDURE — 85730 THROMBOPLASTIN TIME PARTIAL: CPT

## 2019-05-06 PROCEDURE — 87486 CHLMYD PNEUM DNA AMP PROBE: CPT

## 2019-05-06 PROCEDURE — 87581 M.PNEUMON DNA AMP PROBE: CPT

## 2019-05-06 PROCEDURE — 99239 HOSP IP/OBS DSCHRG MGMT >30: CPT

## 2019-05-06 PROCEDURE — 99285 EMERGENCY DEPT VISIT HI MDM: CPT | Mod: 25

## 2019-05-06 PROCEDURE — 71275 CT ANGIOGRAPHY CHEST: CPT

## 2019-05-06 PROCEDURE — 85014 HEMATOCRIT: CPT

## 2019-05-06 PROCEDURE — 84484 ASSAY OF TROPONIN QUANT: CPT

## 2019-05-06 PROCEDURE — 83615 LACTATE (LD) (LDH) ENZYME: CPT

## 2019-05-06 PROCEDURE — 96365 THER/PROPH/DIAG IV INF INIT: CPT | Mod: XU

## 2019-05-06 PROCEDURE — 85610 PROTHROMBIN TIME: CPT

## 2019-05-06 PROCEDURE — 85045 AUTOMATED RETICULOCYTE COUNT: CPT

## 2019-05-06 PROCEDURE — 71046 X-RAY EXAM CHEST 2 VIEWS: CPT

## 2019-05-06 PROCEDURE — 84145 PROCALCITONIN (PCT): CPT

## 2019-05-06 RX ORDER — FOLIC ACID 0.8 MG
1 TABLET ORAL
Qty: 30 | Refills: 0 | OUTPATIENT
Start: 2019-05-06 | End: 2019-06-04

## 2019-05-06 RX ORDER — FOLIC ACID 0.8 MG
1 TABLET ORAL
Qty: 0 | Refills: 0 | COMMUNITY

## 2019-05-06 RX ORDER — CIPROFLOXACIN LACTATE 400MG/40ML
1 VIAL (ML) INTRAVENOUS
Qty: 8 | Refills: 0 | OUTPATIENT
Start: 2019-05-06 | End: 2019-05-13

## 2019-05-06 RX ORDER — HYDROXYUREA 500 MG/1
1 CAPSULE ORAL
Qty: 60 | Refills: 0 | OUTPATIENT
Start: 2019-05-06 | End: 2019-06-04

## 2019-05-06 RX ADMIN — LIDOCAINE 1 PATCH: 4 CREAM TOPICAL at 07:55

## 2019-05-06 RX ADMIN — Medication 1 TABLET(S): at 12:57

## 2019-05-06 RX ADMIN — Medication 1 TABLET(S): at 08:54

## 2019-05-06 RX ADMIN — LIDOCAINE 1 PATCH: 4 CREAM TOPICAL at 08:55

## 2019-05-06 RX ADMIN — Medication 1 MILLIGRAM(S): at 12:57

## 2019-05-06 RX ADMIN — Medication 1 TABLET(S): at 09:50

## 2019-05-06 RX ADMIN — HYDROXYUREA 500 MILLIGRAM(S): 500 CAPSULE ORAL at 06:07

## 2019-05-06 RX ADMIN — PIPERACILLIN AND TAZOBACTAM 25 GRAM(S): 4; .5 INJECTION, POWDER, LYOPHILIZED, FOR SOLUTION INTRAVENOUS at 06:02

## 2019-05-06 NOTE — DISCHARGE NOTE NURSING/CASE MANAGEMENT/SOCIAL WORK - NSDCDPATPORTLINK_GEN_ALL_CORE
You can access the [facility] Patient Portal, offered by [facility], by registering with the following website: [email or web address]

## 2019-05-06 NOTE — DISCHARGE NOTE PROVIDER - HOSPITAL COURSE
35 yo M who presented to the ED w/ dyspnea and L sided chest pain found to have b/l infiltrates concerning for Acute chest syndrome and underlying sickle cell crisis.     Treated for sickle cell crisis, community acquired pneumonia and suspected acute chest syndrome. Initially treated in MICU then transferred to general medicine floors.    Seen by hematology, no exchange transfusion (family refused and patient improved) and hydroxyurea started.  Seen by infectious disease, vanc/zosyn changed to levaquin on discharge.    repeat imaging stressed to patient and mother in 2 weeks to ensure resolution.    blood cultures and viral panel negative.        stable for discharge home    does not require home o2.         dc planning 33 minutes.    d/w patient and mother at bedside.    aaox3 nad rrr s1s2 dec bs left base no wheezing. soft abdomen no LE edema    nonfocal neuro. ambulatory    no acute complaints/events. denies chest pain/cough. ros otherwise negative

## 2019-05-06 NOTE — DISCHARGE NOTE PROVIDER - NSDCCPCAREPLAN_GEN_ALL_CORE_FT
PRINCIPAL DISCHARGE DIAGNOSIS  Diagnosis: Acute chest syndrome  Assessment and Plan of Treatment: resolved  follow up with hematology and infectious disease   repeat imaging in 2-3 weeks      SECONDARY DISCHARGE DIAGNOSES  Diagnosis: Community acquired pneumonia  Assessment and Plan of Treatment: see above    Diagnosis: Sickle cell crisis  Assessment and Plan of Treatment: see above

## 2019-05-06 NOTE — PROGRESS NOTE ADULT - SUBJECTIVE AND OBJECTIVE BOX
[facility]  INFECTIOUS DISEASES AND INTERNAL MEDICINE at [place]  =======================================================  Demian Galindo MD  Diplomates American Board of Internal Medicine and Infectious Diseases  =======================================================    [id number]  STEVIE DEAL     Follow up: Pneumonia vs acute chest syndrome     Clinically improving, as per him and mother now in his baseline. Not on supplemental O2 but with activity had desaturation.    No fever.     PAST MEDICAL & SURGICAL HISTORY:  Perthes disease, left  Sickle cell anemia  No significant past surgical history    Allergies  No Known Allergies    Antibiotics:  piperacillin/tazobactam IVPB. 3.375 Gram(s) IV Intermittent every 8 hours  Azithromycin     REVIEW OF SYSTEMS:  CONSTITUTIONAL:  No Fever or chills  HEENT:  No diplopia or blurred vision.  No sore throat or runny nose.  CARDIOVASCULAR:  No chest pain or SOB.  RESPIRATORY:  No cough, shortness of breath, PND or orthopnea. mild pleuritic chest pain   GASTROINTESTINAL:  No nausea, vomiting or diarrhea.  GENITOURINARY:  No dysuria, frequency or urgency. No Blood in urine  MUSCULOSKELETAL:  no joint aches, no muscle pain  SKIN:  No change in skin, hair or nails.  NEUROLOGIC:  No paresthesias, fasciculations, seizures or weakness.  PSYCHIATRIC:  No disorder of thought or mood.  ENDOCRINE:  No heat or cold intolerance, polyuria or polydipsia.  HEMATOLOGICAL:  No easy bruising or bleeding.     Physical Exam:  Vital Signs Last 24 Hrs  T(C): 37.1 (06 May 2019 04:49), Max: 37.4 (05 May 2019 17:14)  T(F): 98.7 (06 May 2019 04:49), Max: 99.3 (05 May 2019 17:14)  HR: 100 (06 May 2019 04:49) (100 - 102)  BP: 137/87 (06 May 2019 04:49) (117/73 - 137/87)  RR: 18 (05 May 2019 20:58) (18 - 19)  SpO2: 91% (06 May 2019 08:00) (90% - 93%)  GEN: NAD, off any O2 supplement  HEENT: normocephalic and atraumatic. EOMI. PERRL.    NECK: Supple.  No lymphadenopathy   LUNGS: lower lobes with crackles   HEART: Regular rate and rhythm without murmur.  ABDOMEN: Soft, nontender, and nondistended.  Positive bowel sounds.    : No CVA tenderness  EXTREMITIES: Without any cyanosis, clubbing, rash, lesions or edema.  NEUROLOGIC: grossly intact.  PSYCHIATRIC: Appropriate affect .  SKIN: No ulceration or induration present.    Labs:                              9.0    15.7  )-----------( 423      ( 06 May 2019 09:47 )             26.1     RECENT CULTURES:  04-30 @ 19:49      NotDetec    04-30 @ 17:37 .Urine     No growth    04-30 @ 17:01 .Blood     No growth at 5 days.    04-30 @ 16:44 .Blood     No growth at 5 days.    All imaging and other data have been reviewed.

## 2019-05-06 NOTE — PROGRESS NOTE ADULT - ASSESSMENT
33 y/o man with PMH of Sickle cell disease was admitted with left-sided chest pain and shortness of breath for 2days PTA.   Chest CT showed a large LLL infiltration and RLL small opacity. Pneumonia is a stronger possibility. Seems comfortable off O2.    Pneumonia vs acute chest syndrome  Sickle cell anemia    - Blood culture negative x 2 on 4/30  - RVP negative  - Chest CT with multilobar pneumonia.   - O2 management as per primary team.   - Will stop zosyn and azithromycin and start Levaquin 750mg daily to complete total 14days from start of ABx.   - Needs repeat imaging after discharge in few weeks to ensure about resolution of opacities.   - Follow up with hematology after discharge.     Will sign off please call with any question.

## 2019-05-06 NOTE — PROGRESS NOTE ADULT - PROVIDER SPECIALTY LIST ADULT
Critical Care
Critical Care
Heme/Onc
Hospitalist
Infectious Disease
Infectious Disease
Hospitalist

## 2019-05-06 NOTE — PROGRESS NOTE ADULT - ASSESSMENT
1. Sickle cell crisis  2. History of avascular necrosis of left hip in 4/2018   3. Pneunomina CT finding concerning for acute chest syndrome vs PNA; pt continue to improve clinically on current regimen)  4. Elevated Bili      -WBC trending down; TB trending down; Hb holding stable  -IVF, antibiotics, supplement oxygen, analgesic  -on admission, discussed about Exchange transfusion, planned if  if clinical deterioration. Patient's mother stated that patient had severe complication with exchange transfusion when he was 3 and 1/2 year old. We then discussed about cross matched prbc transfusion. Discussed risk and benefit. They want to hold off for now. Clinically much improved; would continue treating for pneumonia; does not need exchange tx at present; will monitor closely  -ID note appreciated; in agreement with plan; cont IV antibiotics  -Start hydrea 500 mg BID in attempt to decrease Hbg S production. Discussed with patient and his mother at bedside including risk and benefit. Cont Folic acid  -trend LDH, TB  and retic  -discussed with patient and with family; I cautioned pt about premature discharge; recommend not being discharged if continues to desaturate with walking  -Will follow. Please call if any question [phone]; to follow up with Dr. Beal's office post discharge 1. Sickle cell crisis  2. History of avascular necrosis of left hip in 4/2018   3. Pneunomina CT finding concerning for acute chest syndrome vs PNA; pt continue to improve clinically on current regimen)  4. Elevated Bili      -WBC stable; TB trending down; Hb holding stable  -IVF, antibiotics, supplement oxygen, analgesic  - cxr, diuresis per primaryt eam  -on admission, discussed about Exchange transfusion, planned if  if clinical deterioration. Patient's mother stated that patient had severe complication with exchange transfusion when he was 3 and 1/2 year old. We then discussed about cross matched prbc transfusion. Discussed risk and benefit. They want to hold off for now. Clinically much improved; would continue treating for pneumonia; does not need exchange tx at present; will monitor closely  -ID note appreciated; in agreement with plan; cont IV antibiotics  -Hydrea 500 mg BID in attempt to decrease Hbg S production.  - Cont Folic acid  -trend LDH, TB  and retic  - discussed with Dr Hester  -Will follow. Please call if any question [phone]; to follow up with Dr. Beal's office post discharge

## 2019-05-06 NOTE — DISCHARGE NOTE PROVIDER - CARE PROVIDER_API CALL
Rojelio Galindo)  Infectious Disease; Internal Medicine  [address], [address]  [place] [address]  Phone: [phone]  Fax: [phone]  Follow Up Time:     Royce Beal)  HematologyOncology  [address], [address]  [place] [address]  Phone: [phone]  Fax: [phone]  Follow Up Time:

## 2019-05-06 NOTE — PROGRESS NOTE ADULT - SUBJECTIVE AND OBJECTIVE BOX
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 Beal in [facility]. Patient presented to ED with complaints of left-sided chest pain and SOB for the past two days.    Patient states he had a sickle cell crisis about two weeks prior, took 2 Ibuprofen and Tylenol/Codeine, and the crisis resolved without needing to go to the hospital. However, pain recurred two days ago prior to presentation and was more severe than usual prompting him to seek medical attention.      Patient states his last crisis was early April 2018 when he was admitted to [facility] for workup, MRI revealed necrosis of hip with residual damage, however he was evaluated by orthopedic and was told he did not require surgery because he maintained full mobility of his hip.    In ED, patient was found to be febrile to 101'F and tachycardic, leukocytosis WBC 26.8, retic 11.3%, lactate 1.3.   Chest xray with concerns for dense LLL infiltrate vs pleural effusion. Patient was given 2L IV fluid bolus, as well as Azithromycin, Rocephin, Zosyn, and Vancomycin empirically for pneumonia.  Hbg 9.7-> 8.8, TBili 10.5-> 5.7,     Interval history:  Breathing much more comfortably; reports mild left anterior chest pain is resolved. denies cough or hemoptysis; Hb 8.4 today  Denies cough or sputum production  Saturating well now off  oxygen at rest; desturated when walking; remains afebrile    ROS:  As above    PAST MEDICAL & SURGICAL HISTORY:  Perthes disease, left  Sickle cell anemia  No significant past surgical history    PHYSICAL EXAM  General: adult in NAD  HEENT: clear oropharynx, anicteric sclera, pink conjunctiva; scleral icterus  Neck: supple  CV: RRR normal S1S2  Lungs: positive air movement b/l ant lungs,clear to auscultation, no wheezes, no rales  Abdomen: soft non-tender non-distended, no hepatosplenomegaly  Ext: no clubbing cyanosis or edema  Skin: no rashes and no petechiae  Neuro: alert and oriented X 4, no focal deficits        Allergies No Known Allergies    MEDICATIONS  (STANDING):  azithromycin   Tablet 500 milliGRAM(s) Oral daily  enoxaparin Injectable 40 milliGRAM(s) SubCutaneous daily  folic acid 1 milliGRAM(s) Oral daily  hydroxyurea 500 milliGRAM(s) Oral two times a day  lidocaine   Patch 1 Patch Transdermal every 24 hours  morphine  - Injectable 2 milliGRAM(s) IV Push once  multivitamin 1 Tablet(s) Oral daily  piperacillin/tazobactam IVPB. 3.375 Gram(s) IV Intermittent every 8 hours  psyllium Powder 1 Packet(s) Oral two times a day  saccharomyces boulardii 250 milliGRAM(s) Oral two times a day    MEDICATIONS  (PRN):  acetaminophen   Tablet .. 650 milliGRAM(s) Oral every 6 hours PRN Temp greater or equal to 38C (100.4F)  acetaminophen 300 mG/codeine 30 mG 1 Tablet(s) Oral every 4 hours PRN Moderate Pain (4 - 6)  morphine  - Injectable 2 milliGRAM(s) IV Push every 6 hours PRN Severe Pain (7 - 10)      Vital Signs Last 24 Hrs  T(C): 37.1 (06 May 2019 04:49), Max: 37.4 (05 May 2019 11:00)  T(F): 98.7 (06 May 2019 04:49), Max: 99.4 (05 May 2019 11:00)  HR: 100 (06 May 2019 04:49) (100 - 117)  BP: 137/87 (06 May 2019 04:49) (117/73 - 137/87)  BP(mean): --  RR: 18 (05 May 2019 20:58) (18 - 19)  SpO2: 92% (06 May 2019 04:00) (84% - 93%)    PHYSICAL EXAM:    GENERAL: NAD,   HEAD:  Atraumatic, Normocephalic  EYES: EOMI, PERRLA, conjunctiva and sclera clear  NECK: Supple, No JVD, Normal thyroid  NERVOUS SYSTEM:    CHEST/LUNG: Clear to percussion bilaterally; No rales, rhonchi,   HEART: Regular rate and rhythm;   ABDOMEN: Soft, Nontender.  EXTREMITIES:   edema:-  LYMPH: No lymphadenopathy noted        LABS:                        8.4    17.6  )-----------( 394      ( 05 May 2019 07:49 )             25.9 HPI:  35 yo male, PMHx sickle cell disease complicated by avascular necrosis in 4/2018 (no surgical intervention) who follows with Dr. Beal in [facility]. Patient presented to ED with complaints of left-sided chest pain and SOB for the past two days.    Patient states he had a sickle cell crisis about two weeks prior, took 2 Ibuprofen and Tylenol/Codeine, and the crisis resolved without needing to go to the hospital. However, pain recurred two days ago prior to presentation and was more severe than usual prompting him to seek medical attention.      Patient states his last crisis was early April 2018 when he was admitted to [facility] for workup, MRI revealed necrosis of hip with residual damage, however he was evaluated by orthopedic and was told he did not require surgery because he maintained full mobility of his hip.    In ED, patient was found to be febrile to 101'F and tachycardic, leukocytosis WBC 26.8, retic 11.3%, lactate 1.3.   Chest xray with concerns for dense LLL infiltrate vs pleural effusion. Patient was given 2L IV fluid bolus, as well as Azithromycin, Rocephin, Zosyn, and Vancomycin empirically for pneumonia.  Hbg 9.7-> 8.8, TBili 10.5-> 5.7-> 2.8,  ->384      Still desating a bit, but improved  Pain stable  No cough   ROS:  As above    PAST MEDICAL & SURGICAL HISTORY:  Perthes disease, left  Sickle cell anemia  No significant past surgical history    PHYSICAL EXAM  General: adult in NAD  HEENT: clear oropharynx, anicteric sclera, pink conjunctiva; scleral icterus  Neck: supple  CV: RRR normal S1S2  Lungs: positive air movement b/l ant lungs,clear to auscultation, no wheezes, no rales  Abdomen: soft non-tender non-distended, no hepatosplenomegaly  Ext: no clubbing cyanosis or edema  Skin: no rashes and no petechiae  Neuro: alert and oriented X 4, no focal deficits        Allergies No Known Allergies    MEDICATIONS  (STANDING):  azithromycin   Tablet 500 milliGRAM(s) Oral daily  enoxaparin Injectable 40 milliGRAM(s) SubCutaneous daily  folic acid 1 milliGRAM(s) Oral daily  hydroxyurea 500 milliGRAM(s) Oral two times a day  lidocaine   Patch 1 Patch Transdermal every 24 hours  morphine  - Injectable 2 milliGRAM(s) IV Push once  multivitamin 1 Tablet(s) Oral daily  piperacillin/tazobactam IVPB. 3.375 Gram(s) IV Intermittent every 8 hours  psyllium Powder 1 Packet(s) Oral two times a day  saccharomyces boulardii 250 milliGRAM(s) Oral two times a day    MEDICATIONS  (PRN):  acetaminophen   Tablet .. 650 milliGRAM(s) Oral every 6 hours PRN Temp greater or equal to 38C (100.4F)  acetaminophen 300 mG/codeine 30 mG 1 Tablet(s) Oral every 4 hours PRN Moderate Pain (4 - 6)  morphine  - Injectable 2 milliGRAM(s) IV Push every 6 hours PRN Severe Pain (7 - 10)      Vital Signs Last 24 Hrs  T(C): 37.1 (06 May 2019 04:49), Max: 37.4 (05 May 2019 11:00)  T(F): 98.7 (06 May 2019 04:49), Max: 99.4 (05 May 2019 11:00)  HR: 100 (06 May 2019 04:49) (100 - 117)  BP: 137/87 (06 May 2019 04:49) (117/73 - 137/87)  BP(mean): --  RR: 18 (05 May 2019 20:58) (18 - 19)  SpO2: 92% (06 May 2019 04:00) (84% - 93%)    PHYSICAL EXAM:    GENERAL: NAD  NECK: Supple, No JVD, Normal thyroid  CHEST/LUNG:  scant ronchi's bialterally  HEART: Regular rate and rhythm;   ABDOMEN: Soft, Nontender.  EXTREMITIES:   no edema  LYMPH: No lymphadenopathy noted        LABS:                        8.4    17.6  )-----------( 394      ( 05 May 2019 07:49 )             25.9

## 2019-05-09 ENCOUNTER — INPATIENT (INPATIENT)
Facility: HOSPITAL | Age: 34
LOS: 0 days | DRG: 871 | End: 2019-05-10
Attending: INTERNAL MEDICINE | Admitting: INTERNAL MEDICINE
Payer: COMMERCIAL

## 2019-05-09 VITALS — WEIGHT: 142.2 LBS | HEIGHT: 70 IN

## 2019-05-09 DIAGNOSIS — D57.01 HB-SS DISEASE WITH ACUTE CHEST SYNDROME: ICD-10-CM

## 2019-05-09 DIAGNOSIS — K72.90 HEPATIC FAILURE, UNSPECIFIED WITHOUT COMA: ICD-10-CM

## 2019-05-09 DIAGNOSIS — E87.2 ACIDOSIS: ICD-10-CM

## 2019-05-09 DIAGNOSIS — D57.1 SICKLE-CELL DISEASE WITHOUT CRISIS: ICD-10-CM

## 2019-05-09 DIAGNOSIS — J96.01 ACUTE RESPIRATORY FAILURE WITH HYPOXIA: ICD-10-CM

## 2019-05-09 DIAGNOSIS — N17.9 ACUTE KIDNEY FAILURE, UNSPECIFIED: ICD-10-CM

## 2019-05-09 PROBLEM — M91.12: Chronic | Status: ACTIVE | Noted: 2019-04-30

## 2019-05-09 LAB
ABO RH CONFIRMATION: SIGNIFICANT CHANGE UP
ALBUMIN SERPL ELPH-MCNC: 2.4 G/DL — LOW (ref 3.3–5.2)
ALBUMIN SERPL ELPH-MCNC: 2.5 G/DL — LOW (ref 3.3–5.2)
ALBUMIN SERPL ELPH-MCNC: 3.9 G/DL — SIGNIFICANT CHANGE UP (ref 3.3–5.2)
ALP SERPL-CCNC: 716 U/L — HIGH (ref 40–120)
ALP SERPL-CCNC: 819 U/L — HIGH (ref 40–120)
ALP SERPL-CCNC: 843 U/L — HIGH (ref 40–120)
ALT FLD-CCNC: 2653 U/L — HIGH
ALT FLD-CCNC: 392 U/L — HIGH
ALT FLD-CCNC: 5565 U/L — HIGH
ANION GAP SERPL CALC-SCNC: 26 MMOL/L — HIGH (ref 5–17)
ANION GAP SERPL CALC-SCNC: 27 MMOL/L — HIGH (ref 5–17)
ANION GAP SERPL CALC-SCNC: 34 MMOL/L — HIGH (ref 5–17)
APPEARANCE UR: CLEAR — SIGNIFICANT CHANGE UP
APTT BLD: 51.7 SEC — HIGH (ref 27.5–36.3)
AST SERPL-CCNC: 1122 U/L — HIGH
AST SERPL-CCNC: 5163 U/L — HIGH
AST SERPL-CCNC: >7000 U/L — HIGH
BASE EXCESS BLDA CALC-SCNC: -23.9 MMOL/L — LOW (ref -2–2)
BILIRUB DIRECT SERPL-MCNC: 2.4 MG/DL — HIGH (ref 0–0.3)
BILIRUB DIRECT SERPL-MCNC: 3.7 MG/DL — HIGH (ref 0–0.3)
BILIRUB SERPL-MCNC: 5 MG/DL — HIGH (ref 0.4–2)
BILIRUB SERPL-MCNC: 5.9 MG/DL — HIGH (ref 0.4–2)
BILIRUB SERPL-MCNC: 6 MG/DL — HIGH (ref 0.4–2)
BILIRUB UR-MCNC: ABNORMAL
BLD GP AB SCN SERPL QL: SIGNIFICANT CHANGE UP
BLOOD GAS COMMENTS ARTERIAL: SIGNIFICANT CHANGE UP
BUN SERPL-MCNC: 22 MG/DL — HIGH (ref 8–20)
BUN SERPL-MCNC: 28 MG/DL — HIGH (ref 8–20)
BUN SERPL-MCNC: 31 MG/DL — HIGH (ref 8–20)
CALCIUM SERPL-MCNC: 12 MG/DL — HIGH (ref 8.6–10.2)
CALCIUM SERPL-MCNC: 7.3 MG/DL — LOW (ref 8.6–10.2)
CALCIUM SERPL-MCNC: 8 MG/DL — LOW (ref 8.4–10.5)
CALCIUM SERPL-MCNC: 8.3 MG/DL — LOW (ref 8.6–10.2)
CHLORIDE SERPL-SCNC: 96 MMOL/L — LOW (ref 98–107)
CHLORIDE SERPL-SCNC: 97 MMOL/L — LOW (ref 98–107)
CHLORIDE SERPL-SCNC: 99 MMOL/L — SIGNIFICANT CHANGE UP (ref 98–107)
CO2 SERPL-SCNC: 13 MMOL/L — LOW (ref 22–29)
CO2 SERPL-SCNC: 13 MMOL/L — LOW (ref 22–29)
CO2 SERPL-SCNC: 7 MMOL/L — CRITICAL LOW (ref 22–29)
COLOR SPEC: ABNORMAL
CREAT SERPL-MCNC: 2.37 MG/DL — HIGH (ref 0.5–1.3)
CREAT SERPL-MCNC: 3.08 MG/DL — HIGH (ref 0.5–1.3)
CREAT SERPL-MCNC: 3.52 MG/DL — HIGH (ref 0.5–1.3)
DIFF PNL FLD: ABNORMAL
DIR ANTIGLOB POLYSPECIFIC INTERPRETATION: SIGNIFICANT CHANGE UP
GAS PNL BLDA: SIGNIFICANT CHANGE UP
GLUCOSE SERPL-MCNC: 106 MG/DL — SIGNIFICANT CHANGE UP (ref 70–115)
GLUCOSE SERPL-MCNC: 217 MG/DL — HIGH (ref 70–115)
GLUCOSE SERPL-MCNC: 70 MG/DL — SIGNIFICANT CHANGE UP (ref 70–115)
GLUCOSE UR QL: NEGATIVE MG/DL — SIGNIFICANT CHANGE UP
HCO3 BLDA-SCNC: 6 MMOL/L — LOW (ref 20–26)
HCT VFR BLD CALC: 14.8 % — CRITICAL LOW (ref 42–52)
HCT VFR BLD CALC: 29.9 % — LOW (ref 42–52)
HGB BLD-MCNC: 10.2 G/DL — LOW (ref 14–18)
HGB BLD-MCNC: 5 G/DL — CRITICAL LOW (ref 14–18)
HOROWITZ INDEX BLDA+IHG-RTO: 1 — SIGNIFICANT CHANGE UP
INR BLD: 2.99 RATIO — HIGH (ref 0.88–1.16)
KETONES UR-MCNC: NEGATIVE — SIGNIFICANT CHANGE UP
LACTATE BLDV-MCNC: 20 MMOL/L — CRITICAL HIGH (ref 0.5–2)
LEUKOCYTE ESTERASE UR-ACNC: ABNORMAL
LYMPHOCYTES # BLD AUTO: 28 % — SIGNIFICANT CHANGE UP (ref 20–55)
MAGNESIUM SERPL-MCNC: 2.2 MG/DL — SIGNIFICANT CHANGE UP (ref 1.8–2.6)
MAGNESIUM SERPL-MCNC: 3.8 MG/DL — HIGH (ref 1.6–2.6)
MCHC RBC-ENTMCNC: 28.7 PG — SIGNIFICANT CHANGE UP (ref 27–31)
MCHC RBC-ENTMCNC: 30.1 PG — SIGNIFICANT CHANGE UP (ref 27–31)
MCHC RBC-ENTMCNC: 33.8 G/DL — SIGNIFICANT CHANGE UP (ref 32–36)
MCHC RBC-ENTMCNC: 34.1 G/DL — SIGNIFICANT CHANGE UP (ref 32–36)
MCV RBC AUTO: 85.1 FL — SIGNIFICANT CHANGE UP (ref 80–94)
MCV RBC AUTO: 88.2 FL — SIGNIFICANT CHANGE UP (ref 80–94)
MONOCYTES NFR BLD AUTO: 5 % — SIGNIFICANT CHANGE UP (ref 3–10)
NEUTROPHILS NFR BLD AUTO: 60 % — SIGNIFICANT CHANGE UP (ref 37–73)
NITRITE UR-MCNC: POSITIVE
NT-PROBNP SERPL-SCNC: 6023 PG/ML — HIGH (ref 0–300)
PCO2 BLDA: 29 MMHG — LOW (ref 35–45)
PH BLDA: 6.92 — CRITICAL LOW (ref 7.35–7.45)
PH UR: 5 — SIGNIFICANT CHANGE UP (ref 5–8)
PHOSPHATE SERPL-MCNC: 5.6 MG/DL — HIGH (ref 2.4–4.7)
PLATELET # BLD AUTO: 106 K/UL — LOW (ref 150–400)
PLATELET # BLD AUTO: 77 K/UL — LOW (ref 150–400)
PO2 BLDA: 88 MMHG — SIGNIFICANT CHANGE UP (ref 83–108)
POTASSIUM SERPL-MCNC: 4.6 MMOL/L — SIGNIFICANT CHANGE UP (ref 3.5–5.3)
POTASSIUM SERPL-MCNC: 5.7 MMOL/L — HIGH (ref 3.5–5.3)
POTASSIUM SERPL-MCNC: 6.1 MMOL/L — CRITICAL HIGH (ref 3.5–5.3)
POTASSIUM SERPL-SCNC: 4.6 MMOL/L — SIGNIFICANT CHANGE UP (ref 3.5–5.3)
POTASSIUM SERPL-SCNC: 5.7 MMOL/L — HIGH (ref 3.5–5.3)
POTASSIUM SERPL-SCNC: 6.1 MMOL/L — CRITICAL HIGH (ref 3.5–5.3)
PROT SERPL-MCNC: 5.1 G/DL — LOW (ref 6.6–8.7)
PROT SERPL-MCNC: 6.1 G/DL — LOW (ref 6.6–8.7)
PROT SERPL-MCNC: 8.9 G/DL — HIGH (ref 6.6–8.7)
PROT UR-MCNC: 100 MG/DL
PROTHROM AB SERPL-ACNC: 35.6 SEC — HIGH (ref 10–12.9)
PTH-INTACT FLD-MCNC: 1151 PG/ML — HIGH (ref 15–65)
RBC # BLD: 1.74 M/UL — LOW (ref 4.6–6.2)
RBC # BLD: 1.74 M/UL — LOW (ref 4.6–6.2)
RBC # BLD: 3.39 M/UL — LOW (ref 4.6–6.2)
RBC # FLD: 15.9 % — HIGH (ref 11–15.6)
RBC # FLD: 35 % — HIGH (ref 11–15.6)
RETICS #: 11.6 K/UL — LOW (ref 25–125)
RETICS/RBC NFR: 6.6 % — HIGH (ref 0.5–2.5)
SAO2 % BLDA: 75 % — LOW (ref 95–99)
SODIUM SERPL-SCNC: 137 MMOL/L — SIGNIFICANT CHANGE UP (ref 135–145)
SODIUM SERPL-SCNC: 137 MMOL/L — SIGNIFICANT CHANGE UP (ref 135–145)
SODIUM SERPL-SCNC: 138 MMOL/L — SIGNIFICANT CHANGE UP (ref 135–145)
SP GR SPEC: 1.02 — SIGNIFICANT CHANGE UP (ref 1.01–1.02)
TROPONIN T SERPL-MCNC: 0.27 NG/ML — HIGH (ref 0–0.06)
TYPE + AB SCN PNL BLD: SIGNIFICANT CHANGE UP
UROBILINOGEN FLD QL: 1 MG/DL
WBC # BLD: 16.5 K/UL — HIGH (ref 4.8–10.8)
WBC # BLD: 53.7 K/UL — CRITICAL HIGH (ref 4.8–10.8)
WBC # FLD AUTO: 16.5 K/UL — HIGH (ref 4.8–10.8)
WBC # FLD AUTO: 53.7 K/UL — CRITICAL HIGH (ref 4.8–10.8)

## 2019-05-09 PROCEDURE — 93010 ELECTROCARDIOGRAM REPORT: CPT

## 2019-05-09 PROCEDURE — 99291 CRITICAL CARE FIRST HOUR: CPT | Mod: 25

## 2019-05-09 PROCEDURE — 71045 X-RAY EXAM CHEST 1 VIEW: CPT | Mod: 26

## 2019-05-09 PROCEDURE — 99291 CRITICAL CARE FIRST HOUR: CPT

## 2019-05-09 PROCEDURE — 74176 CT ABD & PELVIS W/O CONTRAST: CPT | Mod: 26

## 2019-05-09 PROCEDURE — 90937 HEMODIALYSIS REPEATED EVAL: CPT

## 2019-05-09 PROCEDURE — 99255 IP/OBS CONSLTJ NEW/EST HI 80: CPT | Mod: GC,25

## 2019-05-09 PROCEDURE — 71250 CT THORAX DX C-: CPT | Mod: 26

## 2019-05-09 PROCEDURE — 31500 INSERT EMERGENCY AIRWAY: CPT

## 2019-05-09 RX ORDER — VASOPRESSIN 20 [USP'U]/ML
0.04 INJECTION INTRAVENOUS
Qty: 50 | Refills: 0 | Status: DISCONTINUED | OUTPATIENT
Start: 2019-05-09 | End: 2019-05-10

## 2019-05-09 RX ORDER — SODIUM BICARBONATE 1 MEQ/ML
50 SYRINGE (ML) INTRAVENOUS ONCE
Refills: 0 | Status: COMPLETED | OUTPATIENT
Start: 2019-05-09 | End: 2019-05-09

## 2019-05-09 RX ORDER — SODIUM CHLORIDE 9 MG/ML
1000 INJECTION INTRAMUSCULAR; INTRAVENOUS; SUBCUTANEOUS ONCE
Refills: 0 | Status: COMPLETED | OUTPATIENT
Start: 2019-05-09 | End: 2019-05-09

## 2019-05-09 RX ORDER — CHLORHEXIDINE GLUCONATE 213 G/1000ML
1 SOLUTION TOPICAL DAILY
Refills: 0 | Status: DISCONTINUED | OUTPATIENT
Start: 2019-05-09 | End: 2019-05-10

## 2019-05-09 RX ORDER — NOREPINEPHRINE BITARTRATE/D5W 8 MG/250ML
0.05 PLASTIC BAG, INJECTION (ML) INTRAVENOUS
Qty: 8 | Refills: 0 | Status: DISCONTINUED | OUTPATIENT
Start: 2019-05-09 | End: 2019-05-10

## 2019-05-09 RX ORDER — ALBUMIN HUMAN 25 %
250 VIAL (ML) INTRAVENOUS ONCE
Refills: 0 | Status: COMPLETED | OUTPATIENT
Start: 2019-05-09 | End: 2019-05-09

## 2019-05-09 RX ORDER — SODIUM CHLORIDE 9 MG/ML
1000 INJECTION, SOLUTION INTRAVENOUS
Refills: 0 | Status: DISCONTINUED | OUTPATIENT
Start: 2019-05-09 | End: 2019-05-09

## 2019-05-09 RX ORDER — ACETYLCYSTEINE 200 MG/ML
10 VIAL (ML) MISCELLANEOUS ONCE
Refills: 0 | Status: COMPLETED | OUTPATIENT
Start: 2019-05-09 | End: 2019-05-09

## 2019-05-09 RX ORDER — PANTOPRAZOLE SODIUM 20 MG/1
40 TABLET, DELAYED RELEASE ORAL DAILY
Refills: 0 | Status: DISCONTINUED | OUTPATIENT
Start: 2019-05-09 | End: 2019-05-10

## 2019-05-09 RX ORDER — ACETYLCYSTEINE 200 MG/ML
6 VIAL (ML) MISCELLANEOUS ONCE
Refills: 0 | Status: COMPLETED | OUTPATIENT
Start: 2019-05-09 | End: 2019-05-09

## 2019-05-09 RX ORDER — ACETYLCYSTEINE 200 MG/ML
3.2 VIAL (ML) MISCELLANEOUS ONCE
Refills: 0 | Status: COMPLETED | OUTPATIENT
Start: 2019-05-09 | End: 2019-05-09

## 2019-05-09 RX ORDER — INSULIN LISPRO 100/ML
VIAL (ML) SUBCUTANEOUS EVERY 6 HOURS
Refills: 0 | Status: DISCONTINUED | OUTPATIENT
Start: 2019-05-09 | End: 2019-05-10

## 2019-05-09 RX ORDER — GLUCAGON INJECTION, SOLUTION 0.5 MG/.1ML
1 INJECTION, SOLUTION SUBCUTANEOUS ONCE
Refills: 0 | Status: DISCONTINUED | OUTPATIENT
Start: 2019-05-09 | End: 2019-05-10

## 2019-05-09 RX ORDER — VANCOMYCIN HCL 1 G
1000 VIAL (EA) INTRAVENOUS ONCE
Refills: 0 | Status: COMPLETED | OUTPATIENT
Start: 2019-05-09 | End: 2019-05-09

## 2019-05-09 RX ORDER — DEXTROSE 50 % IN WATER 50 %
25 SYRINGE (ML) INTRAVENOUS ONCE
Refills: 0 | Status: DISCONTINUED | OUTPATIENT
Start: 2019-05-09 | End: 2019-05-10

## 2019-05-09 RX ORDER — CALCIUM GLUCONATE 100 MG/ML
1 VIAL (ML) INTRAVENOUS ONCE
Refills: 0 | Status: COMPLETED | OUTPATIENT
Start: 2019-05-09 | End: 2019-05-09

## 2019-05-09 RX ORDER — PHENYLEPHRINE HYDROCHLORIDE 10 MG/ML
0.5 INJECTION INTRAVENOUS
Qty: 40 | Refills: 0 | Status: DISCONTINUED | OUTPATIENT
Start: 2019-05-09 | End: 2019-05-09

## 2019-05-09 RX ORDER — PIPERACILLIN AND TAZOBACTAM 4; .5 G/20ML; G/20ML
3.38 INJECTION, POWDER, LYOPHILIZED, FOR SOLUTION INTRAVENOUS ONCE
Refills: 0 | Status: COMPLETED | OUTPATIENT
Start: 2019-05-09 | End: 2019-05-09

## 2019-05-09 RX ORDER — DEXTROSE 50 % IN WATER 50 %
15 SYRINGE (ML) INTRAVENOUS ONCE
Refills: 0 | Status: DISCONTINUED | OUTPATIENT
Start: 2019-05-09 | End: 2019-05-10

## 2019-05-09 RX ORDER — PIPERACILLIN AND TAZOBACTAM 4; .5 G/20ML; G/20ML
3.38 INJECTION, POWDER, LYOPHILIZED, FOR SOLUTION INTRAVENOUS EVERY 12 HOURS
Refills: 0 | Status: DISCONTINUED | OUTPATIENT
Start: 2019-05-09 | End: 2019-05-10

## 2019-05-09 RX ORDER — SODIUM BICARBONATE 1 MEQ/ML
0.35 SYRINGE (ML) INTRAVENOUS
Qty: 150 | Refills: 0 | Status: DISCONTINUED | OUTPATIENT
Start: 2019-05-09 | End: 2019-05-10

## 2019-05-09 RX ORDER — PHENYLEPHRINE HYDROCHLORIDE 10 MG/ML
0.5 INJECTION INTRAVENOUS
Qty: 40 | Refills: 0 | Status: DISCONTINUED | OUTPATIENT
Start: 2019-05-09 | End: 2019-05-10

## 2019-05-09 RX ORDER — CHLORHEXIDINE GLUCONATE 213 G/1000ML
1 SOLUTION TOPICAL
Refills: 0 | Status: DISCONTINUED | OUTPATIENT
Start: 2019-05-09 | End: 2019-05-09

## 2019-05-09 RX ORDER — SODIUM CHLORIDE 9 MG/ML
1000 INJECTION, SOLUTION INTRAVENOUS
Refills: 0 | Status: DISCONTINUED | OUTPATIENT
Start: 2019-05-09 | End: 2019-05-10

## 2019-05-09 RX ORDER — PROPOFOL 10 MG/ML
10 INJECTION, EMULSION INTRAVENOUS
Qty: 1000 | Refills: 0 | Status: DISCONTINUED | OUTPATIENT
Start: 2019-05-09 | End: 2019-05-10

## 2019-05-09 RX ORDER — CHLORHEXIDINE GLUCONATE 213 G/1000ML
15 SOLUTION TOPICAL
Refills: 0 | Status: DISCONTINUED | OUTPATIENT
Start: 2019-05-09 | End: 2019-05-10

## 2019-05-09 RX ORDER — CALCIUM GLUCONATE 100 MG/ML
2 VIAL (ML) INTRAVENOUS ONCE
Refills: 0 | Status: COMPLETED | OUTPATIENT
Start: 2019-05-09 | End: 2019-05-09

## 2019-05-09 RX ORDER — DEXTROSE 50 % IN WATER 50 %
12.5 SYRINGE (ML) INTRAVENOUS ONCE
Refills: 0 | Status: DISCONTINUED | OUTPATIENT
Start: 2019-05-09 | End: 2019-05-10

## 2019-05-09 RX ORDER — PANTOPRAZOLE SODIUM 20 MG/1
20 TABLET, DELAYED RELEASE ORAL DAILY
Refills: 0 | Status: DISCONTINUED | OUTPATIENT
Start: 2019-05-09 | End: 2019-05-09

## 2019-05-09 RX ORDER — FENTANYL CITRATE 50 UG/ML
25 INJECTION INTRAVENOUS
Refills: 0 | Status: DISCONTINUED | OUTPATIENT
Start: 2019-05-09 | End: 2019-05-10

## 2019-05-09 RX ORDER — DEXTROSE 50 % IN WATER 50 %
50 SYRINGE (ML) INTRAVENOUS ONCE
Refills: 0 | Status: COMPLETED | OUTPATIENT
Start: 2019-05-09 | End: 2019-05-09

## 2019-05-09 RX ADMIN — VASOPRESSIN 2.4 UNIT(S)/MIN: 20 INJECTION INTRAVENOUS at 19:45

## 2019-05-09 RX ADMIN — Medication 300 GRAM(S): at 13:00

## 2019-05-09 RX ADMIN — Medication 64.38 GRAM(S): at 18:20

## 2019-05-09 RX ADMIN — Medication 6.05 MICROGRAM(S)/KG/MIN: at 11:39

## 2019-05-09 RX ADMIN — Medication 50 MILLIEQUIVALENT(S): at 10:13

## 2019-05-09 RX ADMIN — Medication 200 GRAM(S): at 18:18

## 2019-05-09 RX ADMIN — Medication 150 MEQ/KG/HR: at 17:26

## 2019-05-09 RX ADMIN — Medication 50 MILLIEQUIVALENT(S): at 16:49

## 2019-05-09 RX ADMIN — Medication 6.05 MICROGRAM(S)/KG/MIN: at 19:45

## 2019-05-09 RX ADMIN — Medication 200 GRAM(S): at 22:41

## 2019-05-09 RX ADMIN — Medication 1 APPLICATION(S): at 18:21

## 2019-05-09 RX ADMIN — Medication 125 MILLILITER(S): at 15:23

## 2019-05-09 RX ADMIN — Medication 1 DROP(S): at 22:43

## 2019-05-09 RX ADMIN — Medication 129 GRAM(S): at 13:57

## 2019-05-09 RX ADMIN — Medication 50 MILLILITER(S): at 08:02

## 2019-05-09 RX ADMIN — PROPOFOL 3.87 MICROGRAM(S)/KG/MIN: 10 INJECTION, EMULSION INTRAVENOUS at 09:03

## 2019-05-09 RX ADMIN — PIPERACILLIN AND TAZOBACTAM 200 GRAM(S): 4; .5 INJECTION, POWDER, LYOPHILIZED, FOR SOLUTION INTRAVENOUS at 08:16

## 2019-05-09 RX ADMIN — SODIUM CHLORIDE 1000 MILLILITER(S): 9 INJECTION INTRAMUSCULAR; INTRAVENOUS; SUBCUTANEOUS at 08:16

## 2019-05-09 RX ADMIN — CHLORHEXIDINE GLUCONATE 15 MILLILITER(S): 213 SOLUTION TOPICAL at 18:21

## 2019-05-09 RX ADMIN — PANTOPRAZOLE SODIUM 40 MILLIGRAM(S): 20 TABLET, DELAYED RELEASE ORAL at 13:44

## 2019-05-09 RX ADMIN — Medication 250 MILLIGRAM(S): at 09:08

## 2019-05-09 RX ADMIN — PIPERACILLIN AND TAZOBACTAM 25 GRAM(S): 4; .5 INJECTION, POWDER, LYOPHILIZED, FOR SOLUTION INTRAVENOUS at 18:20

## 2019-05-09 RX ADMIN — Medication 6.05 MICROGRAM(S)/KG/MIN: at 18:18

## 2019-05-09 RX ADMIN — FENTANYL CITRATE 25 MICROGRAM(S): 50 INJECTION INTRAVENOUS at 23:15

## 2019-05-09 RX ADMIN — VASOPRESSIN 2.4 UNIT(S)/MIN: 20 INJECTION INTRAVENOUS at 16:26

## 2019-05-09 RX ADMIN — CHLORHEXIDINE GLUCONATE 1 APPLICATION(S): 213 SOLUTION TOPICAL at 13:40

## 2019-05-09 NOTE — PROCEDURE NOTE - NSSITEPREP_SKIN_A_CORE
Adherence to aseptic technique: hand hygiene prior to donning barriers (gown, gloves), don cap and mask, sterile drape over patient chlorhexidine/Adherence to aseptic technique: hand hygiene prior to donning barriers (gown, gloves), don cap and mask, sterile drape over patient

## 2019-05-09 NOTE — CONSULT NOTE ADULT - SUBJECTIVE AND OBJECTIVE BOX
HPI: Patient is a 34y Male seen on consultation for the evaluation and management of Sickle cell crisis.  He has hx of Sickle cell trait and has had relatively uncomplicated course until recently; admitted last year and again last week with back/chest pain.  Found to have infiltrate, resp distress, fever, treated for PNA; Advised to undergo exchange transfusion; Mother adamantly refused.  Clinically improved with IV antibiotics and aggressive resp care.      Strongly advised not to leave before clinically stable.  Pt/Mother insisted on discharge.  was well Tuesday.  Developed back pain yesterday progressed to leg pain; then developed difficulty urinating and became febrile. Mother reports increased use of Tylenol with codeine.   Taken to ER, lethargic, in resp distress, desaturated despite BIPAP; required intubation. Found to be hypotensive, on low dose Levophed.    Extremely acidotic, in renal failure, markedly elevated LFT's.  Hb of 5, retic 6 %; markedly reactive marrow with 51 % RBC's  CT chest shows improvement in LLL consolidation; worsening RLL consolidation.    Seen in MICU, intubated, sedated, family at bedside    PAST MEDICAL & SURGICAL HISTORY:  Perthes disease, left  Sickle cell anemia  No significant past surgical history      REVIEW OF SYSTEMS:  see HPI, unobtainable at present      	        MEDICATIONS  (STANDING):  acetylcysteine IVPB 6 Gram(s) IV Intermittent once  calcium gluconate IVPB 2 Gram(s) IV Intermittent once  chlorhexidine 0.12% Liquid 15 milliLiter(s) Swish and Spit two times a day  chlorhexidine 2% Cloths 1 Application(s) Topical daily  chlorhexidine 4% Liquid 1 Application(s) Topical <User Schedule>  norepinephrine Infusion 0.05 MICROgram(s)/kG/Min (6.047 mL/Hr) IV Continuous <Continuous>  pantoprazole  Injectable 40 milliGRAM(s) IV Push daily  piperacillin/tazobactam IVPB. 3.375 Gram(s) IV Intermittent every 12 hours  propofol Infusion 10 MICROgram(s)/kG/Min (3.87 mL/Hr) IV Continuous <Continuous>    MEDICATIONS  (PRN):      Allergies    No Known Allergies    Intolerances        SOCIAL HISTORY:      Alcohol:denied  Marital Status:single  Occupation:attends college                Allergic/Immunologic:	Vital Signs Last 24 Hrs  T(C): 36.6 (09 May 2019 15:00), Max: 36.7 (09 May 2019 07:56)  T(F): 97.9 (09 May 2019 15:00), Max: 98.1 (09 May 2019 07:56)  HR: 127 (09 May 2019 15:45) (112 - 144)  BP: 87/56 (09 May 2019 15:00) (82/51 - 136/91)  BP(mean): 66 (09 May 2019 15:00) (61 - 82)  RR: 24 (09 May 2019 12:30) (24 - 33)  SpO2: 90% (09 May 2019 15:45) (74% - 100%)    PHYSICAL EXAM:      Constitutional:Intubated, sedated    Eyes:icteric    ENMT:intubated    Neck:no adenopathy; line in neck      Respiratory:vent sounds    Cardiovascular:Tachy S1S2    Gastrointestinal:soft, non-distended    Genitourinary:nascimento in place      Extremities:no edema      Neurological:sedated      Lymph Nodes:no adenopathy              LABS:                        5.0    53.7  )-----------( 106      ( 09 May 2019 08:12 )             14.8     05-    137  |  96<L>  |  28.0<H>  ----------------------------<  70  5.7<H>   |  7.0<LL>  |  3.08<H>    Ca    12.0<H>      09 May 2019 08:12  Mg     3.8     05-    TPro  8.9<H>  /  Alb  3.9  /  TBili  5.0<H>  /  DBili  x   /  AST  1122<H>  /  ALT  392<H>  /  AlkPhos  843<H>  05-    PT/INR - ( 09 May 2019 08:12 )   PT: 35.6 sec;   INR: 2.99 ratio    retic 6%       PTT - ( 09 May 2019 08:12 )  PTT:51.7 sec  Urinalysis Basic - ( 09 May 2019 09:28 )    Color: Delores / Appearance: Clear / S.025 / pH: x  Gluc: x / Ketone: Negative  / Bili: Moderate / Urobili: 1 mg/dL   Blood: x / Protein: 100 mg/dL / Nitrite: Positive   Leuk Esterase: Trace / RBC: 6-10 /HPF / WBC 0-2   Sq Epi: x / Non Sq Epi: Negative / Bacteria: Occasional        RADIOLOGY & ADDITIONAL STUDIES:

## 2019-05-09 NOTE — ED PROVIDER NOTE - CLINICAL SUMMARY MEDICAL DECISION MAKING FREE TEXT BOX
patient acute hypoxic resp failure likely due to acute chest syndrome. broad spec abx/fluids. BIPAP temporarily with close monitoring if failure to improve or decline will intubate. will likely need exchange transfusion.

## 2019-05-09 NOTE — ED PROVIDER NOTE - PHYSICAL EXAMINATION
Gen: somnolent, severe resp distress, arousable and oriented x3   Head: NCAT  HEENT: PERRL, EOMI, oral mucosa dry, +scleral icterus, pale conjunctiva, neck supple  Lung: tachypneic, +belly breathing, +crackles b/l  CV: tachy regular rhythm, no murmur, poor perfusion  Abd: soft, NT, distended  MSK: No edema, no visible deformities  Neuro: No focal neurologic deficits  Skin: No rash   Psych: unable to assess

## 2019-05-09 NOTE — H&P ADULT - PROBLEM SELECTOR PLAN 6
HgB 5.0. Will receive 2 units of PRBC prior to exchange transfusion with additional units throughout. See above. Appears to be hemolyzing as well. with HgB 5.0. Will transfuse now and initiate urgent RBC Exchange. Discussed with Hematology, who agrees.   See above.

## 2019-05-09 NOTE — H&P ADULT - ASSESSMENT
Pt is a 33 y/o male with PMHx sickle cell disease with multiple prior sickle cell pain crisis/acute chest syndrome admissions (recently discharged from [facility] MICU on 5/6/19 from acute chest syndrome vs PNA) presented to [facility] ED in respiratory failure requiring intubation with profound acidosis (pH 6.9, lactate = 20) and MSOF with vasopressor therapy. Pt is a 35 y/o male with PMHx sickle cell disease with multiple prior sickle cell pain crisis/acute chest syndrome admissions (recently discharged from [facility] MICU on 5/6/19 from acute chest syndrome vs PNA) presented to [facility] ED in respiratory failure requiring intubation with profound acidosis (pH 6.9, lactate = 20) and MSOF with vasopressor therapy.       A total of 60 mins of time was spent evaluating and treating this critical patient, including speaking with the mother and All consultants.

## 2019-05-09 NOTE — PROCEDURE NOTE - NSINDICATIONS_GEN_A_CORE
exchange transfusion/critical illness
critical illness
hemodynamic monitoring/critical patient/monitoring purposes

## 2019-05-09 NOTE — PROGRESS NOTE ADULT - ATTENDING COMMENTS
mother and siblings butcher at bedside counseled extensively on the treatments being rendered and overall poor prognosis.

## 2019-05-09 NOTE — ED ADULT NURSE NOTE - ED STAT RN HANDOFF DETAILS
Cardiac monitor and pulse ox continued.  RT present during transport.  Mother aware of plan of care.

## 2019-05-09 NOTE — ED PROVIDER NOTE - SECONDARY DIAGNOSIS.
Liver failure Acute renal failure, unspecified acute renal failure type Acute respiratory failure with hypoxia

## 2019-05-09 NOTE — H&P ADULT - PROBLEM SELECTOR PLAN 4
Given history of excessive ingestion of Tylenol last night, NAC regimen started. Continue to support as above/below. Likely related to Sickle Crisis but Given history of excessive ingestion of Tylenol last few days, concern for toxic effects. Will start NAC regimen. Continue to support as above/below. CHeck Abd/Pelvic CT, F/U LFTS. GI Evaluation

## 2019-05-09 NOTE — PROCEDURE NOTE - NSPROCDETAILS_GEN_ALL_CORE
sterile technique, catheter placed/ultrasound guidance/lumen(s) aspirated and flushed/sterile dressing applied/guidewire recovered
ultrasound guidance/sterile technique, catheter placed/guidewire recovered/lumen(s) aspirated and flushed/sterile dressing applied
positive blood return obtained via catheter/hemostasis with direct pressure, dressing applied/Seldinger technique/sutured in place/ultrasound guidance/location identified, draped/prepped, sterile technique used, needle inserted/introduced/connected to a pressurized flush line/all materials/supplies accounted for at end of procedure

## 2019-05-09 NOTE — ED ADULT NURSE NOTE - NSIMPLEMENTINTERV_GEN_ALL_ED
Implemented All Fall with Harm Risk Interventions:  [place] to call system. Call bell, personal items and telephone within reach. Instruct patient to call for assistance. Room bathroom lighting operational. Non-slip footwear when patient is off stretcher. Physically safe environment: no spills, clutter or unnecessary equipment. Stretcher in lowest position, wheels locked, appropriate side rails in place. Provide visual cue, wrist band, yellow gown, etc. Monitor gait and stability. Monitor for mental status changes and reorient to person, place, and time. Review medications for side effects contributing to fall risk. Reinforce activity limits and safety measures with patient and family. Provide visual clues: red socks.

## 2019-05-09 NOTE — H&P ADULT - PROBLEM SELECTOR PLAN 3
Secondary to elevated lactate/CHAR[place]OTTE/MSOF. Lactate initially 20 now 8.9, pH 6.9-->7.2 after 2 amps of Bicarb and aggressive fluid therapy (3L total). Continue IVF, Serial ABGs/BMPs, trend lactate but important to note acute liver failure. Secondary to elevated lactate/CHARLOTTE/MSOF. Lactate initially 20 now 8.9, Acidosis improving with resuscitation. Will continue aggressive fluid therapy (3L total) and transfusion.  Serial ABGs/BMPs, trend lactate .

## 2019-05-09 NOTE — H&P ADULT - ATTENDING COMMENTS
33 yo male with sickle cell disease, recent admission for acute chest syndrome and pneumonia, presents now with sickle cell crisis, hemolytic anemia, acute respiratory failure, severe metabolic acidosis, distributive shock.    Plan is for exchange transfusion, abx, pressors, mechanical ventilation, IV hydration.  Family updated at bedside.

## 2019-05-09 NOTE — H&P ADULT - PROBLEM SELECTOR PLAN 2
Pt supported on mechanical ventilation and levophed for hypotension. Will receive exhange transfusion today. See above/below. As above   Pt supported on mechanical ventilation and levophed for MAP >65.   Will receive urgent  exhange transfusion today. See above/below.

## 2019-05-09 NOTE — CONSULT NOTE ADULT - SUBJECTIVE AND OBJECTIVE BOX
[facility] DIVISION OF KIDNEY DISEASES AND HYPERTENSION -- INITIAL CONSULT NOTE  --------------------------------------------------------------------------------  HPI:  34M with sickle cell disease, multiple episodes of sickle crises and acute chest syndrome, with pneumonia presenting with shortness of breath, now with pH of 6.9; lactate 13+ ; nephrology consulted for CVVHDF. Now in liver failure; renal failure; hypotensive; on pressor support; intubated; seen/examined; held long discussion with family.    Med HPI:  Pt is a 33 y/o male with PMHx sickle cell disease with multiple prior sickle cell pain crisis/acute chest syndrome admissions (recently discharged from [facility] MICU on 5/6/19 from acute chest syndrome vs PNA) presents with SOB and pain crisis since last night. Pt's mother endorses significant progressive pain starting last night, which typically begins in hip, but progressed to back and then chest shortly after. Pt's mother, who manages son's pain medication routine thoroughly, stated he received q4h Oxycontin with Tylenol w/ codeine in between Oxy doses with no relief. Pt was placed on BiPAP in ED for low O2 sat (70's)/tachypnea but was promptly intubated shortly after due to being profoundly acidotic on ABG (6.9) and increasingly somnolent.     Of note: Pt was d/c 4 days ago due to PNA vs. Acute chest syndrome, given history of recurrent crises, the pt was offered exchange transfusion, when in the ICU last week and this was refused. Pt also had similar episode of acute chest syndrome same time last year. After last visit with nessa Malone, pt was prescribed Hydroxyurea but mother described an incident at [facility] stating [facility] did not carry the medication. They received the Hydroxyurea yesterday along with antibiotics for PNA, and started both medications yesterday.  Mother also describes a "reaction" during exchange transfusion when pt was 9 y/o, but was unable to express exactly what happened.   Pt sees Dr Reeves @ [facility].       PAST HISTORY  --------------------------------------------------------------------------------  PAST MEDICAL & SURGICAL HISTORY:  Perthes disease, left  Sickle cell anemia  No significant past surgical history    FAMILY HISTORY:    PAST SOCIAL HISTORY:    ALLERGIES & MEDICATIONS  --------------------------------------------------------------------------------  Allergies    No Known Allergies    Intolerances      Standing Inpatient Medications  acetylcysteine IVPB 6 Gram(s) IV Intermittent once  artificial tears (preservative free) Ophthalmic Solution 1 Drop(s) Both EYES three times a day  calcium gluconate IVPB 2 Gram(s) IV Intermittent once  chlorhexidine 0.12% Liquid 15 milliLiter(s) Swish and Spit two times a day  chlorhexidine 2% Cloths 1 Application(s) Topical daily  CRRT Treatment    <Continuous>  norepinephrine Infusion 0.05 MICROgram(s)/kG/Min IV Continuous <Continuous>  pantoprazole  Injectable 40 milliGRAM(s) IV Push daily  petrolatum Ophthalmic Ointment 1 Application(s) Both EYES two times a day  piperacillin/tazobactam IVPB. 3.375 Gram(s) IV Intermittent every 12 hours  PrismaSATE Dialysate BGK 4 / 2.5 5000 milliLiter(s) CRRT <Continuous>  PrismaSOL Filtration BGK 4 / 2.5 5000 milliLiter(s) CRRT <Continuous>  PrismaSOL Filtration BGK 4 / 2.5 5000 milliLiter(s) CRRT <Continuous>  propofol Infusion 10 MICROgram(s)/kG/Min IV Continuous <Continuous>  sodium bicarbonate  Infusion 0.349 mEq/kG/Hr IV Continuous <Continuous>  vasopressin Infusion 0.04 Unit(s)/Min IV Continuous <Continuous>    PRN Inpatient Medications      REVIEW OF SYSTEMS  --------------------------------------------------------------------------------  Unable to obtain    VITALS/PHYSICAL EXAM  --------------------------------------------------------------------------------  T(C): 36.6 (05-09-19 @ 15:00), Max: 36.7 (05-09-19 @ 07:56)  HR: 126 (05-09-19 @ 17:00) (112 - 144)  BP: 128/74 (05-09-19 @ 17:00) (82/51 - 156/66)  RR: 24 (05-09-19 @ 12:30) (24 - 33)  SpO2: 81% (05-09-19 @ 17:00) (74% - 100%)  Wt(kg): --  Height (cm): 177.8 (05-09-19 @ 10:00)  Weight (kg): 64.5 (05-09-19 @ 10:00)  BMI (kg/m2): 20.4 (05-09-19 @ 10:00)  BSA (m2): 1.81 (05-09-19 @ 10:00)      05-09-19 @ 07:01  -  05-09-19 @ 18:02  --------------------------------------------------------  IN: 790.9 mL / OUT: 30 mL / NET: 760.9 mL      Physical Exam:  	Gen: int/sed  	HEENT: ETT+  	Pulm: dec BS+  	CV: RRR, S1S2; no rub  	Back: No spinal or CVA tenderness; no sacral edema  	Abd: +BS, soft, nontender/nondistended  	: nascimento+  	UE: Warm, FROM, no clubbing, intact strength; no edema; no asterixis  	LE: Warm, FROM, no clubbing, intact strength; no edema  	Neuro: No focal deficits, intact gait  	Psych: Normal affect and mood  	Skin: Warm, without rashes  	Vascular access:    LABS/STUDIES  --------------------------------------------------------------------------------              5.0    53.7  >-----------<  106      [05-09-19 @ 08:12]              14.8     138  |  99  |  31.0  ----------------------------<  106      [05-09-19 @ 15:55]  6.1   |  13.0  |  3.52        Ca     8.3     [05-09-19 @ 15:55]      Mg     3.8     [05-09-19 @ 08:12]    TPro  x   /  Alb  x   /  TBili  x   /  DBili  2.4  /  AST  x   /  ALT  x   /  AlkPhos  x   [05-09-19 @ 17:12]    PT/INR: PT 35.6 , INR 2.99       [05-09-19 @ 08:12]  PTT: 51.7       [05-09-19 @ 08:12]    Troponin 0.27      [05-09-19 @ 08:12]    Creatinine Trend:  SCr 3.52 [05-09 @ 15:55]  SCr 3.08 [05-09 @ 08:12]  SCr 0.40 [05-03 @ 07:33]  SCr 0.35 [05-02 @ 07:58]  SCr 0.46 [05-01 @ 05:11]    Urinalysis - [05-09-19 @ 09:28]      Color Delores / Appearance Clear / SG 1.025 / pH 5.0      Gluc Negative / Ketone Negative  / Bili Moderate / Urobili 1       Blood Large / Protein 100 / Leuk Est Trace / Nitrite Positive      RBC 6-10 / WBC 0-2 / Hyaline  / Gran  / Sq Epi  / Non Sq Epi Negative / Bacteria Occasional

## 2019-05-09 NOTE — ED PROVIDER NOTE - OBJECTIVE STATEMENT
35yo M with sickle cell, D/C 4 days ago for PNA on hydroxyurea and levaquin, last night fever 101. this morning worsening SOB/lethargic. has not eaten properly in days. nonsmoker. +cough. patient denies any pain at this time.

## 2019-05-09 NOTE — H&P ADULT - NSHPSOCIALHISTORY_GEN_ALL_CORE
As per mother, denies tobacco, alcohol or recreational illicit drug use. Pt has been unable to work due to being unable to lift heavy object or perform exertive tasks. Currently in school.

## 2019-05-09 NOTE — CONSULT NOTE ADULT - ASSESSMENT
Imp:  33 yo male with SS trait by history admitted with resp failure, hypotension, acidosis; Cr up to 3 with poor urine output; markedly elevated LFT's; marked anemia with reactive white cell series  1.  Hypoxia-intubated; txed for acidosis; etiology chest sx vs PNA/sepsis; beginning exchange transfusion; on broad spectrum antibiotics      aggressive management per MICU protocol   2. Hemolytic anemia; receiving one unit PRBC now; to begin exchange check LDH and direct bili  3. renal failure, secondary to above. receiving hydration  4.  Liver failure, secondary to sickle cell crisis, sepsis, possible increased tylenol use  Discussed with MICU team and with family

## 2019-05-09 NOTE — H&P ADULT - PROBLEM SELECTOR PLAN 1
Etiology likely acute chest syndrome but does have recent hospitalizing with suspected PNA, with antibiotics just started yesterday. Continue mechanical ventilation, titrate to maintain SPO2 >92%, HOB elevated 30 degrees. Empiric Zosyn started. Sputum Cx. Pulm consult. Etiology likely acute chest syndrome but was also recent hospitalized with PNA. . Continue mechanical ventilation, titrate to maintain SPO2 >92%, HOB elevated 30 degrees and lung protective strategy,   Empiric Zosyn started along with Vancomycin (will follow by level), for expanded HCAP coverage. . F/U Sputum/Blood cultures Cx.  Cannot exclude PE at this time, without IV contrast. Given the renal failure this is not currently an option. will check Echo, for signs of RV strain. Anticoagulation is not possible with severe hemolysis at this point.

## 2019-05-09 NOTE — CONSULT NOTE ADULT - ASSESSMENT
1) ATN  2) Hyperkalemia  3) Hypotension  4) Shock    Starting CVVHDF  Consent obtained  Orders in chart  Heparin free protocol for now  d/w Dr Thompson

## 2019-05-09 NOTE — ED ADULT TRIAGE NOTE - CHIEF COMPLAINT QUOTE
Patient BIBA, as per EMS patient found barely responsive and tachypneic by mother, patient has hx of sickle cell, sent to critical upon ED arrival, patient recently discharged

## 2019-05-09 NOTE — PROGRESS NOTE ADULT - PROBLEM SELECTOR PLAN 3
initially started to improve but now again worsening due to active hemolysis   nephrology consulted for CVVHDF to assist with lactate clearance and electrolyte management.

## 2019-05-09 NOTE — ED PROVIDER NOTE - CARE PLAN
Principal Discharge DX:	Acute chest syndrome due to sickle cell crisis  Secondary Diagnosis:	Liver failure  Secondary Diagnosis:	Acute renal failure, unspecified acute renal failure type  Secondary Diagnosis:	Acute respiratory failure with hypoxia

## 2019-05-09 NOTE — PROGRESS NOTE ADULT - SUBJECTIVE AND OBJECTIVE BOX
Patient is a 34y old  Male who presents with a chief complaint of acute respiratory failure (09 May 2019 17:59)      BRIEF HOSPITAL COURSE:     Events last 24 hours: pt admitted     PAST MEDICAL & SURGICAL HISTORY:  Perthes disease, left  Sickle cell anemia  No significant past surgical history        Medications:  piperacillin/tazobactam IVPB. 3.375 Gram(s) IV Intermittent every 12 hours    norepinephrine Infusion 0.05 MICROgram(s)/kG/Min IV Continuous <Continuous>      propofol Infusion 10 MICROgram(s)/kG/Min IV Continuous <Continuous>        pantoprazole  Injectable 40 milliGRAM(s) IV Push daily      vasopressin Infusion 0.04 Unit(s)/Min IV Continuous <Continuous>    calcium gluconate IVPB 2 Gram(s) IV Intermittent once  sodium bicarbonate  Infusion 0.349 mEq/kG/Hr IV Continuous <Continuous>      artificial tears (preservative free) Ophthalmic Solution 1 Drop(s) Both EYES three times a day  chlorhexidine 0.12% Liquid 15 milliLiter(s) Swish and Spit two times a day  chlorhexidine 2% Cloths 1 Application(s) Topical daily  petrolatum Ophthalmic Ointment 1 Application(s) Both EYES two times a day    acetylcysteine IVPB 6 Gram(s) IV Intermittent once  CRRT Treatment    <Continuous>  PrismaSATE Dialysate BGK 4 / 2.5 5000 milliLiter(s) CRRT <Continuous>  PrismaSOL Filtration BGK 4 / 2.5 5000 milliLiter(s) CRRT <Continuous>  PrismaSOL Filtration BGK 4 / 2.5 5000 milliLiter(s) CRRT <Continuous>      Mode: AC/ CMV (Assist Control/ Continuous Mandatory Ventilation)  RR (machine): 30  TV (machine): 400  FiO2: 50  PEEP: 5  MAP: 10  PIP: 23      ICU Vital Signs Last 24 Hrs  T(C): 36.6 (09 May 2019 15:00), Max: 36.7 (09 May 2019 07:56)  T(F): 97.9 (09 May 2019 15:00), Max: 98.1 (09 May 2019 07:56)  HR: 126 (09 May 2019 17:00) (112 - 144)  BP: 128/74 (09 May 2019 17:00) (82/51 - 156/66)  BP(mean): 96 (09 May 2019 17:00) (61 - 96)  ABP: 96/33 (09 May 2019 17:00) (90/36 - 117/47)  ABP(mean): 52 (09 May 2019 17:00) (49 - 77)  RR: 24 (09 May 2019 12:30) (24 - 33)  SpO2: 81% (09 May 2019 17:00) (74% - 100%)      ABG - ( 09 May 2019 16:24 )  pH, Arterial: 6.99  pH, Blood: x     /  pCO2: 36    /  pO2: 78    / HCO3: 9     / Base Excess: -20.8 /  SaO2: 82                  I&O's Detail    09 May 2019 07:01  -  09 May 2019 18:18  --------------------------------------------------------  IN:    norepinephrine Infusion: 229 mL    Packed Red Blood Cells: 300 mL    propofol Infusion: 11.9 mL    Solution: 250 mL  Total IN: 790.9 mL    OUT:    Voided: 30 mL  Total OUT: 30 mL    Total NET: 760.9 mL            LABS:                        5.0    53.7  )-----------( 106      ( 09 May 2019 08:12 )             14.8     05-09    138  |  99  |  31.0<H>  ----------------------------<  106  6.1<HH>   |  13.0<L>  |  3.52<H>    Ca    8.3<L>      09 May 2019 15:55  Mg     3.8         TPro  x   /  Alb  x   /  TBili  x   /  DBili  2.4<H>  /  AST  x   /  ALT  x   /  AlkPhos  x   05-09      CARDIAC MARKERS ( 09 May 2019 08:12 )  x     / 0.27 ng/mL / x     / x     / x          CAPILLARY BLOOD GLUCOSE      POCT Blood Glucose.: 294 mg/dL (09 May 2019 08:49)    PT/INR - ( 09 May 2019 08:12 )   PT: 35.6 sec;   INR: 2.99 ratio         PTT - ( 09 May 2019 08:12 )  PTT:51.7 sec  Urinalysis Basic - ( 09 May 2019 09:28 )    Color: Delores / Appearance: Clear / S.025 / pH: x  Gluc: x / Ketone: Negative  / Bili: Moderate / Urobili: 1 mg/dL   Blood: x / Protein: 100 mg/dL / Nitrite: Positive   Leuk Esterase: Trace / RBC: 6-10 /HPF / WBC 0-2   Sq Epi: x / Non Sq Epi: Negative / Bacteria: Occasional      CULTURES:      Physical Examination:    General: No acute distress.      HEENT: Pupils equal, reactive to light.  Symmetric.    PULM: Clear to auscultation bilaterally, no significant sputum production    NECK: Supple, no lymphadenopathy, trachea midline    CVS: Regular rate and rhythm, no murmurs, rubs, or gallops    GI: Soft, nondistended, nontender, normoactive bowel sounds, no masses    EXT: No edema, nontender    SKIN: Warm and well perfused, no rashes noted.    NEURO: Alert, oriented, interactive, nonfocal    DEVICES:     RADIOLOGY:   < from: Xray Chest 1 View- PORTABLE-Urgent (19 @ 13:18) >    IMPRESSION:   Bibasilar multifocal airspace consolidations of.    ET tube tip of within proximal RIGHT mainstem bronchus. Retraction   approximately 2.3 cm recommended...    NG tube in stomach. LEFT IJ catheter tip in SVC.    A SUBSEQUENT SECOND RADIOGRAPH OBTAINED 2019@1:07 PM SHOWS RETRACTION   OF THE ET TUBE WHICH LIES ABOVE TRACHEAL BIFURCATION                PETER SANDS M.D., ATTENDING RADIOLOGIST  This document has been electronically signed. May  9 2019  2:33PM        < end of copied text >    CRITICAL CARE TIME SPENT: Patient is a 34y old  Male who presents with a chief complaint of acute respiratory failure (09 May 2019 17:59)      BRIEF HOSPITAL COURSE: 35y/o pt with sickle cell disease who was admitted to MICU  for acute chest syndrome with left base pneumonia at that time was offered exchange transfusion but both pt and mother refused. Pt continued to be treated in hospital and was dischaged , pt returns to ER today in extremis - intubated for lethargy and hypoxemia. Pt has now deteriorated into multipressor shock and fulminant multiorgan failure. Lactic acid is again rising and pH below 7 will need to proceed to renal replacement therapy expedictiously to offer the best chance for acid clearance, knowing that there is a chance he will not survive and that hemolysis is an ongoing issue. Family counseled extensively on risk and benefit of all treatments being rendered at this time. Dr. Montano has explicitly explained the dialysis process.     Events last 24 hours: as above      PAST MEDICAL & SURGICAL HISTORY:  Perthes disease, left  Sickle cell anemia  No significant past surgical history        Medications:  piperacillin/tazobactam IVPB. 3.375 Gram(s) IV Intermittent every 12 hours    norepinephrine Infusion 0.05 MICROgram(s)/kG/Min IV Continuous <Continuous>      propofol Infusion 10 MICROgram(s)/kG/Min IV Continuous <Continuous>        pantoprazole  Injectable 40 milliGRAM(s) IV Push daily      vasopressin Infusion 0.04 Unit(s)/Min IV Continuous <Continuous>    calcium gluconate IVPB 2 Gram(s) IV Intermittent once  sodium bicarbonate  Infusion 0.349 mEq/kG/Hr IV Continuous <Continuous>      artificial tears (preservative free) Ophthalmic Solution 1 Drop(s) Both EYES three times a day  chlorhexidine 0.12% Liquid 15 milliLiter(s) Swish and Spit two times a day  chlorhexidine 2% Cloths 1 Application(s) Topical daily  petrolatum Ophthalmic Ointment 1 Application(s) Both EYES two times a day    acetylcysteine IVPB 6 Gram(s) IV Intermittent once  CRRT Treatment    <Continuous>  PrismaSATE Dialysate BGK 4 / 2.5 5000 milliLiter(s) CRRT <Continuous>  PrismaSOL Filtration BGK 4 / 2.5 5000 milliLiter(s) CRRT <Continuous>  PrismaSOL Filtration BGK 4 / 2.5 5000 milliLiter(s) CRRT <Continuous>      Mode: AC/ CMV (Assist Control/ Continuous Mandatory Ventilation)  RR (machine): 30  TV (machine): 400  FiO2: 50  PEEP: 5  MAP: 10  PIP: 23      ICU Vital Signs Last 24 Hrs  T(C): 36.6 (09 May 2019 15:00), Max: 36.7 (09 May 2019 07:56)  T(F): 97.9 (09 May 2019 15:00), Max: 98.1 (09 May 2019 07:56)  HR: 126 (09 May 2019 17:00) (112 - 144)  BP: 128/74 (09 May 2019 17:00) (82/51 - 156/66)  BP(mean): 96 (09 May 2019 17:00) (61 - 96)  ABP: 96/33 (09 May 2019 17:00) (90/36 - 117/47)  ABP(mean): 52 (09 May 2019 17:00) (49 - 77)  RR: 24 (09 May 2019 12:30) (24 - 33)  SpO2: 81% (09 May 2019 17:00) (74% - 100%)      ABG - ( 09 May 2019 16:24 )  pH, Arterial: 6.99  pH, Blood: x     /  pCO2: 36    /  pO2: 78    / HCO3: 9     / Base Excess: -20.8 /  SaO2: 82                  I&O's Detail    09 May 2019 07:01  -  09 May 2019 18:18  --------------------------------------------------------  IN:    norepinephrine Infusion: 229 mL    Packed Red Blood Cells: 300 mL    propofol Infusion: 11.9 mL    Solution: 250 mL  Total IN: 790.9 mL    OUT:    Voided: 30 mL  Total OUT: 30 mL    Total NET: 760.9 mL            LABS:                        5.0    53.7  )-----------( 106      ( 09 May 2019 08:12 )             14.8     05-09    138  |  99  |  31.0<H>  ----------------------------<  106  6.1<HH>   |  13.0<L>  |  3.52<H>    Ca    8.3<L>      09 May 2019 15:55  Mg     3.8         TPro  x   /  Alb  x   /  TBili  x   /  DBili  2.4<H>  /  AST  x   /  ALT  x   /  AlkPhos  x         CARDIAC MARKERS ( 09 May 2019 08:12 )  x     / 0.27 ng/mL / x     / x     / x          CAPILLARY BLOOD GLUCOSE      POCT Blood Glucose.: 294 mg/dL (09 May 2019 08:49)    PT/INR - ( 09 May 2019 08:12 )   PT: 35.6 sec;   INR: 2.99 ratio         PTT - ( 09 May 2019 08:12 )  PTT:51.7 sec  Urinalysis Basic - ( 09 May 2019 09:28 )    Color: Delores / Appearance: Clear / S.025 / pH: x  Gluc: x / Ketone: Negative  / Bili: Moderate / Urobili: 1 mg/dL   Blood: x / Protein: 100 mg/dL / Nitrite: Positive   Leuk Esterase: Trace / RBC: 6-10 /HPF / WBC 0-2   Sq Epi: x / Non Sq Epi: Negative / Bacteria: Occasional      CULTURES:      Physical Examination:    General: No acute distress.      HEENT: Pupils equal, reactive to light.  Symmetric. scleral icterus and edema, pale conjunctivae     PULM: Coarse to auscultation bilaterally, no significant sputum production    NECK: Supple, no lymphadenopathy, trachea midline    CVS: tachycardia     GI: Soft, nondistended, nontender, normoactive bowel sounds, no masses    EXT: No edema, nontender    SKIN: Warm and well perfused, no rashes noted.    NEURO: sedated, unresponsive     DEVICES: [facility] TLC, R fem HD cath, R fem art 19      RADIOLOGY:   < from: Xray Chest 1 View- PORTABLE-Urgent (19 @ 13:18) >    IMPRESSION:   Bibasilar multifocal airspace consolidations of.    ET tube tip of within proximal RIGHT mainstem bronchus. Retraction   approximately 2.3 cm recommended...    NG tube in stomach. LEFT IJ catheter tip in SVC.    A SUBSEQUENT SECOND RADIOGRAPH OBTAINED 2019@1:07 PM SHOWS RETRACTION   OF THE ET TUBE WHICH LIES ABOVE TRACHEAL BIFURCATION                PETER SANDS M.D., ATTENDING RADIOLOGIST  This document has been electronically signed. May  9 2019  2:33PM        < end of copied text >    CRITICAL CARE TIME SPENT: 45 minutes

## 2019-05-09 NOTE — ED PROVIDER NOTE - PROGRESS NOTE DETAILS
no improvement on BIPAP, stilla rousable but significantly acidotic, metabolic. new renal/liver failure. discussed with mom and patient severity of illness, persistent hypoxia sats in 80s, 70 on ABG need for intubation. patient successfully intubated, ICU @ bedside. nascimento/NG tube placed. Heme paged for exchange transfusion, pending call back -Giovanni DO mom states taking several tylenols, oxy and tylenlol #3, acetaminoph[en level ordered, likely start NAC -Slowey DO

## 2019-05-09 NOTE — PROGRESS NOTE ADULT - PROBLEM SELECTOR PLAN 1
recurrent acute chest due to new infiltrate on RLL in addition to prior LLL infiltrate for which he was being treated for on last admission.  broadened  to Zosyn HCAP coverage- 1 dose Vanco given    Continue mechanical ventilation, titrate to maintain SPO2 >92%, HOB elevated 30 degrees and lung protective strategy,

## 2019-05-09 NOTE — H&P ADULT - PROBLEM SELECTOR PLAN 5
Given severity of acidosis and overall condition, will need to continue with aggressive hydration. Will avoid neprhotoxic drugs and initiate renal dosage for pharmacologic interventions. Trend Cr. Likely related to Sickle Crisis, Given severity of acidosis and overall condition, will need to continue with aggressive hydration. Will avoid neprhotoxic drugs and initiate renal dosage for pharmacologic interventions. Trend Cr. F/U ABGs, Currently making Urine, Acidosis improving as well a K. No acute indication for HD at this time

## 2019-05-09 NOTE — ED PROVIDER NOTE - CRITICAL CARE PROVIDED
additional history taking/interpretation of diagnostic studies/direct patient care (not related to procedure)/consult w/ pt's family directly relating to pts condition/conducted a detailed discussion of DNR status/consultation with other physicians/documentation

## 2019-05-09 NOTE — CHART NOTE - NSCHARTNOTEFT_GEN_A_CORE
Critical Care  34M with severe Sickle Crisis, with Hemolysis,  ARF, Acute Liver Failure. WIth Severe Lactic acidosis. Intubated on pressors.    WIth Initial resuscitation, Acidosis was improving from ph 6.9 to 7.22. LActate from 20 to 8. However in spite of Transfusion exchange, broad spectrum IV Abx, and NAC, his over all condition is continuing to decline with worsening acidosis. Discussed with Renal and CVVH will now be added.    I spoke with the patient's mother at the bedside updating her regarding his clinical deterioration. She understands the severity of the situation and that this is life threatening, as well as that in spite of all efforts, he may not improve and ultimately may not survive.      Dr Thompson aware and will continue to follow.

## 2019-05-09 NOTE — PROCEDURE NOTE - NSPOSTCAREGUIDE_GEN_A_CORE
Care for catheter as per unit/ICU protocols Verbal/written post procedure instructions were given to patient/caregiver/Care for catheter as per unit/ICU protocols

## 2019-05-09 NOTE — H&P ADULT - HISTORY OF PRESENT ILLNESS
Pt is a 35 y/o male with PMHx sickle cell disease with multiple prior sickle cell pain crisis/acute chest syndrome admissions (recently discharged from [facility] MICU on 5/6/19 from acute chest syndrome vs PNA) presents with SOB and pain crisis since last night. Pt's mother endorses significant progressive pain starting last night, which typically begins in hip, but progressed to back and then chest shortly after. Pt's mother, who manages son's pain medication routine thoroughly, stated he received q4h Oxycontin with Tylenol w/ codeine in between Oxy doses with no relief. Pt was placed on BiPAP in ED for low O2 sat (70's)/tachypnea but was promptly intubated shortly after due to being profoundly acidotic on ABG (6.9) and increasingly somnolent.     Of note: Pt was d/c 4 days ago due to PNA vs. Acute chest syndrome, given history of recurrent crises, pt was offered exchange transfusion but mother states this was never offered to her during that hospitalization, despite ICU attending stating otherwise. Pt also had similar episode of acute chest syndrome same time last year. After last visit with nessa Malone, pt was prescribed Hydroxyurea but mother described [facility]. They received the Hydroxyurea yesterday along with antibiotics for PNA, and started both medications yesterday. Mother also describes an unfortunate experience during exchange transfusion when pt was 7 y/o, but was unable to express exactly what happened. Pt sees Dr Reeves @ [facility]. Pt is a 33 y/o male with PMHx sickle cell disease with multiple prior sickle cell pain crisis/acute chest syndrome admissions (recently discharged from [facility] MICU on 5/6/19 from acute chest syndrome vs PNA) presents with SOB and pain crisis since last night. Pt's mother endorses significant progressive pain starting last night, which typically begins in hip, but progressed to back and then chest shortly after. Pt's mother, who manages son's pain medication routine thoroughly, stated he received q4h Oxycontin with Tylenol w/ codeine in between Oxy doses with no relief. Pt was placed on BiPAP in ED for low O2 sat (70's)/tachypnea but was promptly intubated shortly after due to being profoundly acidotic on ABG (6.9) and increasingly somnolent.     Of note: Pt was d/c 4 days ago due to PNA vs. Acute chest syndrome, given history of recurrent crises, the pt was offered exchange transfusion, when in the ICU last week and this was refused. Pt also had similar episode of acute chest syndrome same time last year. After last visit with nessa Malone, pt was prescribed Hydroxyurea but mother described an incident at [facility] stating [facility] not [facility]. They received the Hydroxyurea yesterday along with antibiotics for PNA, and started both medications yesterday.  Mother also describes a "reaction" during exchange transfusion when pt was 9 y/o, but was unable to express exactly what happened.   Pt sees Dr Reeves @ [facility].

## 2019-05-09 NOTE — ED ADULT NURSE NOTE - SEPSIS SCREEN SIGNS AND SYMPTOMS, MLM
heart rate greater than 90 beats/min/respiratory rate greater than 20 breaths/min/acutely altered mental status

## 2019-05-10 VITALS — TEMPERATURE: 98 F

## 2019-05-10 LAB
ALBUMIN SERPL ELPH-MCNC: 1.7 G/DL — LOW (ref 3.3–5.2)
ALBUMIN SERPL ELPH-MCNC: 1.9 G/DL — LOW (ref 3.3–5.2)
ALBUMIN SERPL ELPH-MCNC: 1.9 G/DL — LOW (ref 3.3–5.2)
ALP SERPL-CCNC: 639 U/L — HIGH (ref 40–120)
ALP SERPL-CCNC: 643 U/L — HIGH (ref 40–120)
ALP SERPL-CCNC: 654 U/L — HIGH (ref 40–120)
ALT FLD-CCNC: 5211 U/L — HIGH
ALT FLD-CCNC: 5965 U/L — HIGH
ALT FLD-CCNC: 5965 U/L — HIGH
ANION GAP SERPL CALC-SCNC: 32 MMOL/L — HIGH (ref 5–17)
ANION GAP SERPL CALC-SCNC: >29 MMOL/L — HIGH (ref 5–17)
AST SERPL-CCNC: >7000 U/L — HIGH
BILIRUB DIRECT SERPL-MCNC: 3.5 MG/DL — HIGH (ref 0–0.3)
BILIRUB INDIRECT FLD-MCNC: 2 MG/DL — HIGH (ref 0.2–1)
BILIRUB SERPL-MCNC: 4.9 MG/DL — HIGH (ref 0.4–2)
BILIRUB SERPL-MCNC: 5.3 MG/DL — HIGH (ref 0.4–2)
BILIRUB SERPL-MCNC: 5.5 MG/DL — HIGH (ref 0.4–2)
BUN SERPL-MCNC: 17 MG/DL — SIGNIFICANT CHANGE UP (ref 8–20)
BUN SERPL-MCNC: 17 MG/DL — SIGNIFICANT CHANGE UP (ref 8–20)
CALCIUM SERPL-MCNC: 7.2 MG/DL — LOW (ref 8.6–10.2)
CALCIUM SERPL-MCNC: 7.3 MG/DL — LOW (ref 8.6–10.2)
CHLORIDE SERPL-SCNC: 101 MMOL/L — SIGNIFICANT CHANGE UP (ref 98–107)
CHLORIDE SERPL-SCNC: 96 MMOL/L — LOW (ref 98–107)
CO2 SERPL-SCNC: 11 MMOL/L — LOW (ref 22–29)
CO2 SERPL-SCNC: <6 MMOL/L — CRITICAL LOW (ref 22–29)
CREAT SERPL-MCNC: 1.82 MG/DL — HIGH (ref 0.5–1.3)
CREAT SERPL-MCNC: 1.93 MG/DL — HIGH (ref 0.5–1.3)
CULTURE RESULTS: NO GROWTH — SIGNIFICANT CHANGE UP
GAS PNL BLDA: SIGNIFICANT CHANGE UP
GLUCOSE BLDC GLUCOMTR-MCNC: 105 MG/DL — HIGH (ref 70–99)
GLUCOSE SERPL-MCNC: 147 MG/DL — HIGH (ref 70–115)
GLUCOSE SERPL-MCNC: 241 MG/DL — HIGH (ref 70–115)
HCT VFR BLD CALC: 22.3 % — LOW (ref 42–52)
HCT VFR BLD CALC: 24.3 % — LOW (ref 42–52)
HGB BLD-MCNC: 7.4 G/DL — LOW (ref 14–18)
HGB BLD-MCNC: 8.3 G/DL — LOW (ref 14–18)
MAGNESIUM SERPL-MCNC: 2 MG/DL — SIGNIFICANT CHANGE UP (ref 1.8–2.6)
MAGNESIUM SERPL-MCNC: 2.1 MG/DL — SIGNIFICANT CHANGE UP (ref 1.8–2.6)
MCHC RBC-ENTMCNC: 29.4 PG — SIGNIFICANT CHANGE UP (ref 27–31)
MCHC RBC-ENTMCNC: 30.9 PG — SIGNIFICANT CHANGE UP (ref 27–31)
MCHC RBC-ENTMCNC: 33.2 G/DL — SIGNIFICANT CHANGE UP (ref 32–36)
MCHC RBC-ENTMCNC: 34.2 G/DL — SIGNIFICANT CHANGE UP (ref 32–36)
MCV RBC AUTO: 88.5 FL — SIGNIFICANT CHANGE UP (ref 80–94)
MCV RBC AUTO: 90.3 FL — SIGNIFICANT CHANGE UP (ref 80–94)
PHOSPHATE SERPL-MCNC: 6.5 MG/DL — HIGH (ref 2.4–4.7)
PLATELET # BLD AUTO: 60 K/UL — LOW (ref 150–400)
PLATELET # BLD AUTO: 92 K/UL — LOW (ref 150–400)
POTASSIUM SERPL-MCNC: 4.4 MMOL/L — SIGNIFICANT CHANGE UP (ref 3.5–5.3)
POTASSIUM SERPL-MCNC: 5.5 MMOL/L — HIGH (ref 3.5–5.3)
POTASSIUM SERPL-SCNC: 4.4 MMOL/L — SIGNIFICANT CHANGE UP (ref 3.5–5.3)
POTASSIUM SERPL-SCNC: 5.5 MMOL/L — HIGH (ref 3.5–5.3)
PROT SERPL-MCNC: 3.9 G/DL — LOW (ref 6.6–8.7)
PROT SERPL-MCNC: 3.9 G/DL — LOW (ref 6.6–8.7)
PROT SERPL-MCNC: 4.1 G/DL — LOW (ref 6.6–8.7)
RBC # BLD: 2.52 M/UL — LOW (ref 4.6–6.2)
RBC # BLD: 2.69 M/UL — LOW (ref 4.6–6.2)
RBC # FLD: 17 % — HIGH (ref 11–15.6)
RBC # FLD: 17.5 % — HIGH (ref 11–15.6)
SODIUM SERPL-SCNC: 136 MMOL/L — SIGNIFICANT CHANGE UP (ref 135–145)
SODIUM SERPL-SCNC: 139 MMOL/L — SIGNIFICANT CHANGE UP (ref 135–145)
SPECIMEN SOURCE: SIGNIFICANT CHANGE UP
WBC # BLD: 16 K/UL — HIGH (ref 4.8–10.8)
WBC # BLD: 16.3 K/UL — HIGH (ref 4.8–10.8)
WBC # FLD AUTO: 16 K/UL — HIGH (ref 4.8–10.8)
WBC # FLD AUTO: 16.3 K/UL — HIGH (ref 4.8–10.8)

## 2019-05-10 PROCEDURE — 36600 WITHDRAWAL OF ARTERIAL BLOOD: CPT

## 2019-05-10 PROCEDURE — 83605 ASSAY OF LACTIC ACID: CPT

## 2019-05-10 PROCEDURE — 94660 CPAP INITIATION&MGMT: CPT

## 2019-05-10 PROCEDURE — 87040 BLOOD CULTURE FOR BACTERIA: CPT

## 2019-05-10 PROCEDURE — 99291 CRITICAL CARE FIRST HOUR: CPT | Mod: 25

## 2019-05-10 PROCEDURE — 74176 CT ABD & PELVIS W/O CONTRAST: CPT

## 2019-05-10 PROCEDURE — 85610 PROTHROMBIN TIME: CPT

## 2019-05-10 PROCEDURE — 86923 COMPATIBILITY TEST ELECTRIC: CPT

## 2019-05-10 PROCEDURE — 86905 BLOOD TYPING RBC ANTIGENS: CPT

## 2019-05-10 PROCEDURE — 93005 ELECTROCARDIOGRAM TRACING: CPT

## 2019-05-10 PROCEDURE — 82947 ASSAY GLUCOSE BLOOD QUANT: CPT

## 2019-05-10 PROCEDURE — 82330 ASSAY OF CALCIUM: CPT

## 2019-05-10 PROCEDURE — 84295 ASSAY OF SERUM SODIUM: CPT

## 2019-05-10 PROCEDURE — 82248 BILIRUBIN DIRECT: CPT

## 2019-05-10 PROCEDURE — P9045: CPT

## 2019-05-10 PROCEDURE — 87150 DNA/RNA AMPLIFIED PROBE: CPT

## 2019-05-10 PROCEDURE — 80076 HEPATIC FUNCTION PANEL: CPT

## 2019-05-10 PROCEDURE — 36430 TRANSFUSION BLD/BLD COMPNT: CPT

## 2019-05-10 PROCEDURE — 85730 THROMBOPLASTIN TIME PARTIAL: CPT

## 2019-05-10 PROCEDURE — 85027 COMPLETE CBC AUTOMATED: CPT

## 2019-05-10 PROCEDURE — 82962 GLUCOSE BLOOD TEST: CPT

## 2019-05-10 PROCEDURE — 82803 BLOOD GASES ANY COMBINATION: CPT

## 2019-05-10 PROCEDURE — 83735 ASSAY OF MAGNESIUM: CPT

## 2019-05-10 PROCEDURE — 86900 BLOOD TYPING SEROLOGIC ABO: CPT

## 2019-05-10 PROCEDURE — 99291 CRITICAL CARE FIRST HOUR: CPT

## 2019-05-10 PROCEDURE — 87086 URINE CULTURE/COLONY COUNT: CPT

## 2019-05-10 PROCEDURE — 96375 TX/PRO/DX INJ NEW DRUG ADDON: CPT | Mod: XU

## 2019-05-10 PROCEDURE — 83970 ASSAY OF PARATHORMONE: CPT

## 2019-05-10 PROCEDURE — 51702 INSERT TEMP BLADDER CATH: CPT | Mod: XU

## 2019-05-10 PROCEDURE — 85014 HEMATOCRIT: CPT

## 2019-05-10 PROCEDURE — 84132 ASSAY OF SERUM POTASSIUM: CPT

## 2019-05-10 PROCEDURE — 71045 X-RAY EXAM CHEST 1 VIEW: CPT

## 2019-05-10 PROCEDURE — 84484 ASSAY OF TROPONIN QUANT: CPT

## 2019-05-10 PROCEDURE — 82435 ASSAY OF BLOOD CHLORIDE: CPT

## 2019-05-10 PROCEDURE — 81001 URINALYSIS AUTO W/SCOPE: CPT

## 2019-05-10 PROCEDURE — 83880 ASSAY OF NATRIURETIC PEPTIDE: CPT

## 2019-05-10 PROCEDURE — 36415 COLL VENOUS BLD VENIPUNCTURE: CPT

## 2019-05-10 PROCEDURE — 80053 COMPREHEN METABOLIC PANEL: CPT

## 2019-05-10 PROCEDURE — 85045 AUTOMATED RETICULOCYTE COUNT: CPT

## 2019-05-10 PROCEDURE — P9016: CPT

## 2019-05-10 PROCEDURE — 86901 BLOOD TYPING SEROLOGIC RH(D): CPT

## 2019-05-10 PROCEDURE — 94003 VENT MGMT INPAT SUBQ DAY: CPT

## 2019-05-10 PROCEDURE — 94002 VENT MGMT INPAT INIT DAY: CPT

## 2019-05-10 PROCEDURE — 86880 COOMBS TEST DIRECT: CPT

## 2019-05-10 PROCEDURE — 86850 RBC ANTIBODY SCREEN: CPT

## 2019-05-10 PROCEDURE — 82310 ASSAY OF CALCIUM: CPT

## 2019-05-10 PROCEDURE — 90937 HEMODIALYSIS REPEATED EVAL: CPT

## 2019-05-10 PROCEDURE — 96374 THER/PROPH/DIAG INJ IV PUSH: CPT | Mod: XU

## 2019-05-10 PROCEDURE — 71250 CT THORAX DX C-: CPT

## 2019-05-10 PROCEDURE — 84100 ASSAY OF PHOSPHORUS: CPT

## 2019-05-10 PROCEDURE — 31500 INSERT EMERGENCY AIRWAY: CPT

## 2019-05-10 RX ORDER — CALCIUM GLUCONATE 100 MG/ML
1 VIAL (ML) INTRAVENOUS ONCE
Refills: 0 | Status: COMPLETED | OUTPATIENT
Start: 2019-05-10 | End: 2019-05-10

## 2019-05-10 RX ORDER — SODIUM CHLORIDE 9 MG/ML
1000 INJECTION, SOLUTION INTRAVENOUS ONCE
Refills: 0 | Status: COMPLETED | OUTPATIENT
Start: 2019-05-10 | End: 2019-05-10

## 2019-05-10 RX ORDER — SODIUM CHLORIDE 9 MG/ML
500 INJECTION, SOLUTION INTRAVENOUS ONCE
Refills: 0 | Status: COMPLETED | OUTPATIENT
Start: 2019-05-10 | End: 2019-05-10

## 2019-05-10 RX ORDER — SODIUM BICARBONATE 1 MEQ/ML
100 SYRINGE (ML) INTRAVENOUS ONCE
Refills: 0 | Status: COMPLETED | OUTPATIENT
Start: 2019-05-10 | End: 2019-05-10

## 2019-05-10 RX ORDER — EPINEPHRINE 0.3 MG/.3ML
0.1 INJECTION INTRAMUSCULAR; SUBCUTANEOUS
Qty: 4 | Refills: 0 | Status: DISCONTINUED | OUTPATIENT
Start: 2019-05-10 | End: 2019-05-10

## 2019-05-10 RX ORDER — CALCIUM CHLORIDE
1000 POWDER (GRAM) MISCELLANEOUS ONCE
Refills: 0 | Status: COMPLETED | OUTPATIENT
Start: 2019-05-10 | End: 2019-05-10

## 2019-05-10 RX ORDER — HYDROMORPHONE HYDROCHLORIDE 2 MG/ML
1 INJECTION INTRAMUSCULAR; INTRAVENOUS; SUBCUTANEOUS ONCE
Refills: 0 | Status: DISCONTINUED | OUTPATIENT
Start: 2019-05-10 | End: 2019-05-10

## 2019-05-10 RX ORDER — DEXMEDETOMIDINE HYDROCHLORIDE IN 0.9% SODIUM CHLORIDE 4 UG/ML
0.4 INJECTION INTRAVENOUS
Qty: 200 | Refills: 0 | Status: DISCONTINUED | OUTPATIENT
Start: 2019-05-10 | End: 2019-05-10

## 2019-05-10 RX ORDER — HYDROMORPHONE HYDROCHLORIDE 2 MG/ML
2 INJECTION INTRAMUSCULAR; INTRAVENOUS; SUBCUTANEOUS
Qty: 100 | Refills: 0 | Status: DISCONTINUED | OUTPATIENT
Start: 2019-05-10 | End: 2019-05-10

## 2019-05-10 RX ORDER — HEPARIN SODIUM 5000 [USP'U]/ML
1400 INJECTION INTRAVENOUS; SUBCUTANEOUS
Qty: 10000 | Refills: 0 | Status: DISCONTINUED | OUTPATIENT
Start: 2019-05-10 | End: 2019-05-10

## 2019-05-10 RX ADMIN — HYDROMORPHONE HYDROCHLORIDE 2 MG/HR: 2 INJECTION INTRAMUSCULAR; INTRAVENOUS; SUBCUTANEOUS at 08:01

## 2019-05-10 RX ADMIN — SODIUM CHLORIDE 3000 MILLILITER(S): 9 INJECTION, SOLUTION INTRAVENOUS at 06:02

## 2019-05-10 RX ADMIN — Medication 6.05 MICROGRAM(S)/KG/MIN: at 00:30

## 2019-05-10 RX ADMIN — Medication 150 MEQ/KG/HR: at 00:32

## 2019-05-10 RX ADMIN — CHLORHEXIDINE GLUCONATE 15 MILLILITER(S): 213 SOLUTION TOPICAL at 06:00

## 2019-05-10 RX ADMIN — PIPERACILLIN AND TAZOBACTAM 25 GRAM(S): 4; .5 INJECTION, POWDER, LYOPHILIZED, FOR SOLUTION INTRAVENOUS at 06:00

## 2019-05-10 RX ADMIN — HYDROMORPHONE HYDROCHLORIDE 1 MILLIGRAM(S): 2 INJECTION INTRAMUSCULAR; INTRAVENOUS; SUBCUTANEOUS at 08:16

## 2019-05-10 RX ADMIN — Medication 1 DROP(S): at 05:59

## 2019-05-10 RX ADMIN — VASOPRESSIN 2.4 UNIT(S)/MIN: 20 INJECTION INTRAVENOUS at 00:32

## 2019-05-10 RX ADMIN — Medication 200 GRAM(S): at 01:27

## 2019-05-10 RX ADMIN — Medication 6.05 MICROGRAM(S)/KG/MIN: at 08:17

## 2019-05-10 RX ADMIN — Medication 1000 MILLIGRAM(S): at 00:10

## 2019-05-10 RX ADMIN — DEXMEDETOMIDINE HYDROCHLORIDE IN 0.9% SODIUM CHLORIDE 6.45 MICROGRAM(S)/KG/HR: 4 INJECTION INTRAVENOUS at 08:17

## 2019-05-10 RX ADMIN — HYDROMORPHONE HYDROCHLORIDE 2 MG/HR: 2 INJECTION INTRAMUSCULAR; INTRAVENOUS; SUBCUTANEOUS at 08:16

## 2019-05-10 RX ADMIN — Medication 2 MILLIGRAM(S): at 11:02

## 2019-05-10 RX ADMIN — PHENYLEPHRINE HYDROCHLORIDE 12.09 MICROGRAM(S)/KG/MIN: 10 INJECTION INTRAVENOUS at 00:31

## 2019-05-10 RX ADMIN — EPINEPHRINE 25 MICROGRAM(S)/KG/MIN: 0.3 INJECTION INTRAMUSCULAR; SUBCUTANEOUS at 00:30

## 2019-05-10 RX ADMIN — Medication 100 MILLIEQUIVALENT(S): at 06:35

## 2019-05-10 RX ADMIN — Medication 2: at 01:44

## 2019-05-10 RX ADMIN — Medication 100 MILLIEQUIVALENT(S): at 00:10

## 2019-05-10 RX ADMIN — Medication 1 APPLICATION(S): at 06:00

## 2019-05-10 RX ADMIN — HEPARIN SODIUM 2.8 UNIT(S)/HR: 5000 INJECTION INTRAVENOUS; SUBCUTANEOUS at 02:54

## 2019-05-10 RX ADMIN — HYDROMORPHONE HYDROCHLORIDE 1 MILLIGRAM(S): 2 INJECTION INTRAMUSCULAR; INTRAVENOUS; SUBCUTANEOUS at 08:01

## 2019-05-10 RX ADMIN — DEXMEDETOMIDINE HYDROCHLORIDE IN 0.9% SODIUM CHLORIDE 6.45 MICROGRAM(S)/KG/HR: 4 INJECTION INTRAVENOUS at 06:00

## 2019-05-10 RX ADMIN — SODIUM CHLORIDE 4000 MILLILITER(S): 9 INJECTION, SOLUTION INTRAVENOUS at 07:38

## 2019-05-10 RX ADMIN — FENTANYL CITRATE 25 MICROGRAM(S): 50 INJECTION INTRAVENOUS at 00:00

## 2019-05-10 NOTE — PROGRESS NOTE ADULT - SUBJECTIVE AND OBJECTIVE BOX
[facility] DIVISION OF KIDNEY DISEASES AND HYPERTENSION -- FOLLOW UP NOTE  --------------------------------------------------------------------------------  Chief Complaint:  ATN    24 hour events/subjective:  Pt made palliative by family this AM;   CVVHDF just discontinued this morning;      PAST HISTORY  --------------------------------------------------------------------------------  No significant changes to PMH, PSH, FHx, SHx, unless otherwise noted    ALLERGIES & MEDICATIONS  --------------------------------------------------------------------------------  Allergies    No Known Allergies    Intolerances      Standing Inpatient Medications  CRRT Treatment    <Continuous>  dexmedetomidine Infusion 0.4 MICROgram(s)/kG/Hr IV Continuous <Continuous>  EPINEPHrine    Infusion 0.103 MICROgram(s)/kG/Min IV Continuous <Continuous>  heparin  Infusion Syringe 1400 Unit(s)/Hr Dialysis <Continuous>  HYDROmorphone Infusion 2 mG/Hr IV Continuous <Continuous>  LORazepam   Injectable 2 milliGRAM(s) IV Push every 6 hours  norepinephrine Infusion 0.05 MICROgram(s)/kG/Min IV Continuous <Continuous>  phenylephrine    Infusion 0.5 MICROgram(s)/kG/Min IV Continuous <Continuous>  PrismaSATE Dialysate BGK 4 / 2.5 5000 milliLiter(s) CRRT <Continuous>  PrismaSOL Filtration BGK 4 / 2.5 5000 milliLiter(s) CRRT <Continuous>  PrismaSOL Filtration BGK 4 / 2.5 5000 milliLiter(s) CRRT <Continuous>  vasopressin Infusion 0.04 Unit(s)/Min IV Continuous <Continuous>    PRN Inpatient Medications      REVIEW OF SYSTEMS  --------------------------------------------------------------------------------  Unable to obtain    VITALS/PHYSICAL EXAM  --------------------------------------------------------------------------------  T(C): 36.6 (05-10-19 @ 11:26), Max: 36.6 (05-09-19 @ 15:00)  HR: 0 (05-10-19 @ 09:57) (0 - 136)  BP: 107/57 (05-09-19 @ 20:00) (87/56 - 156/66)  RR: 30 (05-10-19 @ 05:00) (24 - 30)  SpO2: 60% (05-10-19 @ 09:57) (60% - 100%)  Wt(kg): --  Height (cm): 177.8 (05-09-19 @ 10:00)  Weight (kg): 64.5 (05-09-19 @ 10:00)  BMI (kg/m2): 20.4 (05-09-19 @ 10:00)  BSA (m2): 1.81 (05-09-19 @ 10:00)      05-09-19 @ 07:01  -  05-10-19 @ 07:00  --------------------------------------------------------  IN: 7699.6 mL / OUT: 43 mL / NET: 7656.6 mL    05-10-19 @ 07:01  -  05-10-19 @ 11:49  --------------------------------------------------------  IN: 1546.2 mL / OUT: 0 mL / NET: 1546.2 mL      Physical Exam:  	Gen: int/sed  	HEENT: ETT+  	Pulm: dec BS+  	CV: RRR, S1S2; no rub  	Back: No spinal or CVA tenderness; no sacral edema  	Abd: +BS, soft, nontender/nondistended  	: nascimento+  	UE: Warm, FROM, no clubbing, intact strength; no edema; no asterixis  	LE: Warm, FROM, no clubbing, intact strength; no edema  	Skin: Warm, without rashes  LABS/STUDIES  --------------------------------------------------------------------------------              7.4    16.3  >-----------<  92       [05-10-19 @ 05:31]              22.3     139  |  96  |  17.0  ----------------------------<  147      [05-10-19 @ 05:59]  4.4   |  11.0  |  1.93        Ca     7.3     [05-10-19 @ 05:59]      Mg     2.0     [05-10-19 @ 05:59]      Phos  6.5     [05-10-19 @ 00:18]    TPro  3.9  /  Alb  1.9  /  TBili  5.3  /  DBili  3.5  /  AST  >7000  /  ALT  5965  /  AlkPhos  643  [05-10-19 @ 05:59]    PT/INR: PT 35.6 , INR 2.99       [05-09-19 @ 08:12]  PTT: 51.7       [05-09-19 @ 08:12]    Troponin 0.27      [05-09-19 @ 08:12]    Creatinine Trend:  SCr 1.93 [05-10 @ 05:59]  SCr 1.82 [05-10 @ 00:18]  SCr 2.37 [05-09 @ 21:01]  SCr 3.52 [05-09 @ 15:55]  SCr 3.08 [05-09 @ 08:12]    Urinalysis - [05-09-19 @ 09:28]      Color Delores / Appearance Clear / SG 1.025 / pH 5.0      Gluc Negative / Ketone Negative  / Bili Moderate / Urobili 1       Blood Large / Protein 100 / Leuk Est Trace / Nitrite Positive      RBC 6-10 / WBC 0-2 / Hyaline  / Gran  / Sq Epi  / Non Sq Epi Negative / Bacteria Occasional      PTH -- (Ca 8.0)      [05-09-19 @ 22:20]   1151

## 2019-05-10 NOTE — PROGRESS NOTE ADULT - SUBJECTIVE AND OBJECTIVE BOX
INTERVAL HPI/OVERNIGHT EVENTS: Started on CVVH due to worsening acidosis.  Required multiple pressors.  Blood pressure dropped dramatically whenever pain med given.      On Admission  19 (1d)  HPI:  Pt is a 35 y/o male with PMHx sickle cell disease with multiple prior sickle cell pain crisis/acute chest syndrome admissions (recently discharged from [facility] MICU on 19 from acute chest syndrome vs PNA) presents with SOB and pain crisis since last night. Pt's mother endorses significant progressive pain starting last night, which typically begins in hip, but progressed to back and then chest shortly after. Pt's mother, who manages son's pain medication routine thoroughly, stated he received q4h Oxycontin with Tylenol w/ codeine in between Oxy doses with no relief. Pt was placed on BiPAP in ED for low O2 sat (70's)/tachypnea but was promptly intubated shortly after due to being profoundly acidotic on ABG (6.9) and increasingly somnolent.     Of note: Pt was d/c 4 days ago due to PNA vs. Acute chest syndrome, given history of recurrent crises, the pt was offered exchange transfusion, when in the ICU last week and this was refused. Pt also had similar episode of acute chest syndrome same time last year. After last visit with nessa Malone, pt was prescribed Hydroxyurea but mother described an incident at [facility] stating [facility] did not carry the medication. They received the Hydroxyurea yesterday along with antibiotics for PNA, and started both medications yesterday.  Mother also describes a "reaction" during exchange transfusion when pt was 7 y/o, but was unable to express exactly what happened.   Pt sees Dr Reeves @ [facility]. (09 May 2019 12:46)    PAST MEDICAL & SURGICAL HISTORY:  Perthes disease, left  Sickle cell anemia  No significant past surgical history      Antimicrobial:  piperacillin/tazobactam IVPB. 3.375 Gram(s) IV Intermittent every 12 hours    Cardiovascular:  EPINEPHrine    Infusion 0.103 MICROgram(s)/kG/Min IV Continuous <Continuous>  norepinephrine Infusion 0.05 MICROgram(s)/kG/Min IV Continuous <Continuous>  phenylephrine    Infusion 0.5 MICROgram(s)/kG/Min IV Continuous <Continuous>    Pulmonary:    Hematalogic:  heparin  Infusion Syringe 1400 Unit(s)/Hr Dialysis <Continuous>    Other:  artificial tears (preservative free) Ophthalmic Solution 1 Drop(s) Both EYES three times a day  chlorhexidine 0.12% Liquid 15 milliLiter(s) Swish and Spit two times a day  chlorhexidine 2% Cloths 1 Application(s) Topical daily  CRRT Treatment    <Continuous>  dexmedetomidine Infusion 0.4 MICROgram(s)/kG/Hr IV Continuous <Continuous>  dextrose 40% Gel 15 Gram(s) Oral once PRN  dextrose 5%. 1000 milliLiter(s) IV Continuous <Continuous>  dextrose 50% Injectable 12.5 Gram(s) IV Push once  dextrose 50% Injectable 25 Gram(s) IV Push once  dextrose 50% Injectable 25 Gram(s) IV Push once  fentaNYL    Injectable 25 MICROGram(s) IV Push every 2 hours PRN  glucagon  Injectable 1 milliGRAM(s) IntraMuscular once PRN  HYDROmorphone Infusion 2 mG/Hr IV Continuous <Continuous>  insulin lispro (HumaLOG) corrective regimen sliding scale   SubCutaneous every 6 hours  pantoprazole  Injectable 40 milliGRAM(s) IV Push daily  petrolatum Ophthalmic Ointment 1 Application(s) Both EYES two times a day  PrismaSATE Dialysate BGK 4 / 2.5 5000 milliLiter(s) CRRT <Continuous>  PrismaSOL Filtration BGK 4 / 2.5 5000 milliLiter(s) CRRT <Continuous>  PrismaSOL Filtration BGK 4 / 2.5 5000 milliLiter(s) CRRT <Continuous>  sodium bicarbonate  Infusion 0.349 mEq/kG/Hr IV Continuous <Continuous>  vasopressin Infusion 0.04 Unit(s)/Min IV Continuous <Continuous>      Drug Dosing Weight  Height (cm): 177.8 (09 May 2019 10:00)  Weight (kg): 64.5 (09 May 2019 10:00)  BMI (kg/m2): 20.4 (09 May 2019 10:00)  BSA (m2): 1.81 (09 May 2019 10:00)    T(C): 34 (05-10-19 @ 06:00), Max: 36.7 (19 @ 08:25)  HR: 120 (05-10-19 @ 06:35)  BP: 107/57 (19 @ 20:00)  BP(mean): 75 (19 @ 20:00)  ABP: 96/48 (05-10-19 @ 06:35)  ABP(mean): 62 (05-10-19 @ 06:35)  RR: 30 (05-10-19 @ 05:00)  SpO2: 71% (05-10-19 @ 06:35)    ABG - ( 10 May 2019 03:19 )  pH, Arterial: 7.24  pH, Blood: x     /  pCO2: 30    /  pO2: 138   / HCO3: 14    / Base Excess: -13.4 /  SaO2: 99                     @ 07:01  -  05-10 @ 07:00  --------------------------------------------------------  IN: 7383.2 mL / OUT: 43 mL / NET: 7340.2 mL        Mode: AC/ CMV (Assist Control/ Continuous Mandatory Ventilation)  RR (machine): 30  TV (machine): 400  FiO2: 40  PEEP: 5  MAP: 12  PIP: 24      PHYSICAL EXAM:    GENERAL: intubated, unresponsive  HEAD:  Atraumatic, normocephalic  EYES: PERRL  ENMT:  MMM,   NECK:  Supple, normal appearance, trachea midline  NERVOUS SYSTEM:  unresponsive, on sedation  CHEST/LUNG: Bilateral air entry, no crackles or wheeze  HEART: tachycardic, reg rhythm  ABDOMEN: hypoactive bowel sounds, distended abdomen  EXTREMITIES: no clubbing, no cyanosis.  LYMPH:  No lymphadenopathy noted  SKIN:  delayed capillary refill  PSYCH: unable to assess     LABS:  CBC Full  -  ( 10 May 2019 05:31 )  WBC Count : 16.3 K/uL  RBC Count : 2.52 M/uL  Hemoglobin : 7.4 g/dL  Hematocrit : 22.3 %  Platelet Count - Automated : 92 K/uL  Mean Cell Volume : 88.5 fl  Mean Cell Hemoglobin : 29.4 pg  Mean Cell Hemoglobin Concentration : 33.2 g/dL  Auto Neutrophil # : x  Auto Lymphocyte # : x  Auto Monocyte # : x  Auto Eosinophil # : x  Auto Basophil # : x  Auto Neutrophil % : x  Auto Lymphocyte % : x  Auto Monocyte % : x  Auto Eosinophil % : x  Auto Basophil % : x    0510    139  |  96<L>  |  17.0  ----------------------------<  147<H>  4.4   |  11.0<L>  |  1.93<H>    Ca    7.3<L>      10 May 2019 05:59  Phos  6.5     05-10  Mg     2.0     05-10    TPro  3.9<L>  /  Alb  1.9<L>  /  TBili  5.3<H>  /  DBili  3.5<H>  /  AST  >7000<H>  /  ALT  5965<H>  /  AlkPhos  643<H>  05-10    PT/INR - ( 09 May 2019 08:12 )   PT: 35.6 sec;   INR: 2.99 ratio         PTT - ( 09 May 2019 08:12 )  PTT:51.7 sec  Urinalysis Basic - ( 09 May 2019 09:28 )    Color: Delores / Appearance: Clear / S.025 / pH: x  Gluc: x / Ketone: Negative  / Bili: Moderate / Urobili: 1 mg/dL   Blood: x / Protein: 100 mg/dL / Nitrite: Positive   Leuk Esterase: Trace / RBC: 6-10 /HPF / WBC 0-2   Sq Epi: x / Non Sq Epi: Negative / Bacteria: Occasional        GOALS OF CARE DISCUSSION WITH PATIENT/FAMILY/PROXY: Spoke with mother at bedside, informed her and rest of family of overall condition and prognosis, as well as expectations for the future if he were to survive this episode.  She told me he has been living with pain for many years and he would not want any further suffering, she elected for comfort measures.     CRITICAL CARE TIME SPENT: 40 min spent adjusting vasoactive meds including inotropes/pressors, adjusting vent settings, implementing CVVH (and supsequently discontinuing it), and adjusting IV sedation in this critically ill patient at high risk of death.

## 2019-05-10 NOTE — DIETITIAN INITIAL EVALUATION ADULT. - OTHER INFO
35 yo male with sickle cell disease, recent admission for acute chest syndrome and pneumonia, presents now with sickle cell crisis, hemolytic anemia, acute respiratory failure, severe metabolic acidosis, multipressor distributive/septic shock, acute renal failure requiring CVVH (being discontinued). Aware comfort measures now initiated. 35 yo male with sickle cell disease, recent admission for acute chest syndrome and pneumonia, presents now with sickle cell crisis, hemolytic anemia, acute respiratory failure, severe metabolic acidosis, multipressor distributive/septic shock, acute renal failure requiring CVVH. Aware comfort measures now initiated.

## 2019-05-10 NOTE — DIETITIAN INITIAL EVALUATION ADULT. - PROBLEM SELECTOR PLAN 4
Likely related to Sickle Crisis but Given history of excessive ingestion of Tylenol last few days, concern for toxic effects. Will start NAC regimen. Continue to support as above/below. CHeck Abd/Pelvic CT, F/U LFTS. GI Evaluation

## 2019-05-10 NOTE — DISCHARGE NOTE FOR THE EXPIRED PATIENT - HOSPITAL COURSE
33 yo male with sickle cell disease, recent admission for acute chest syndrome and pneumonia, presents now with sickle cell crisis, hemolytic anemia, acute respiratory failure, severe metabolic acidosis, multipressor distributive/septic shock, acute renal failure requiring CVVH.  Patient underwent exchange transfusion yesterday, but despite this he continued to worsen.  Family opted for comfort measures only, DNR.  CVVH was discontinued, patient started on dilaudid infusion, became asystolic at 9:57 AM prior to being extubated.  Family at bedside, thanked us for our care.

## 2019-05-10 NOTE — PROGRESS NOTE ADULT - ASSESSMENT
1) ATN  2) Hyperkalemia  3) Hypotension  4) Shock    Discontinued CVVHDF  Grave prognosis;  d/w Dr Lowery
35 yo male with sickle cell disease, recent admission for acute chest syndrome and pneumonia, presents now with sickle cell crisis, hemolytic anemia, acute respiratory failure, severe metabolic acidosis, multipressor distributive/septic shock, acute renal failure requiring CVVH.  Patient underwent exchange transfusion yesterday, but despite this he continues to worsen.  Family opted for comfort measures only, DNR.    Plan:  - Start dilaudid infusion, ativan  - Discontinue CVVH  - Once patient is comfortable on adequate sedation, then will remove other life sustaining therapies (pressors, mechanical ventilation)
33 y/o male with PMHx sickle cell disease recently discharged from [facility] MICU on 5/6/19 from acute chest syndrome presented to [facility] ED in respiratory failure requiring intubation with profound acidosis (pH 6.9, lactate = 13.5) and MSOF with multi- vasopressor therapy.

## 2019-05-10 NOTE — DIETITIAN INITIAL EVALUATION ADULT. - PERTINENT LABORATORY DATA
05-10 Na139 mmol/L Glu 147 mg/dL<H> K+ 4.4 mmol/L Cr  1.93 mg/dL<H> BUN 17.0 mg/dL Phos n/a   Alb 1.9 g/dL<L> PAB n/a

## 2019-05-10 NOTE — DIETITIAN INITIAL EVALUATION ADULT. - PROBLEM SELECTOR PLAN 5
Likely related to Sickle Crisis, Given severity of acidosis and overall condition, will need to continue with aggressive hydration. Will avoid neprhotoxic drugs and initiate renal dosage for pharmacologic interventions. Trend Cr. F/U ABGs, Currently making Urine, Acidosis improving as well a K. No acute indication for HD at this time

## 2019-05-10 NOTE — DIETITIAN INITIAL EVALUATION ADULT. - PROBLEM SELECTOR PLAN 2
As above   Pt supported on mechanical ventilation and levophed for MAP >65.   Will receive urgent  exhange transfusion today. See above/below.

## 2019-05-10 NOTE — DIETITIAN INITIAL EVALUATION ADULT. - PROBLEM SELECTOR PLAN 1
Etiology likely acute chest syndrome but was also recent hospitalized with PNA. . Continue mechanical ventilation, titrate to maintain SPO2 >92%, HOB elevated 30 degrees and lung protective strategy,   Empiric Zosyn started along with Vancomycin (will follow by level), for expanded HCAP coverage. . F/U Sputum/Blood cultures Cx.  Cannot exclude PE at this time, without IV contrast. Given the renal failure this is not currently an option. will check Echo, for signs of RV strain. Anticoagulation is not possible with severe hemolysis at this point.

## 2019-05-10 NOTE — DIETITIAN INITIAL EVALUATION ADULT. - PROBLEM SELECTOR PLAN 3
Secondary to elevated lactate/[place]/MSOF. Lactate initially 20 now 8.9, Acidosis improving with resuscitation. Will continue aggressive fluid therapy (3L total) and transfusion.  Serial ABGs/BMPs, trend lactate .

## 2019-05-10 NOTE — DIETITIAN INITIAL EVALUATION ADULT. - PROBLEM SELECTOR PLAN 6
Appears to be hemolyzing as well. with HgB 5.0. Will transfuse now and initiate urgent RBC Exchange. Discussed with Hematology, who agrees.   See above.

## 2019-05-14 LAB
-  CANDIDA ALBICANS: SIGNIFICANT CHANGE UP
-  CANDIDA GLABRATA: SIGNIFICANT CHANGE UP
-  CANDIDA KRUSEI: SIGNIFICANT CHANGE UP
-  CANDIDA PARAPSILOSIS: SIGNIFICANT CHANGE UP
-  CANDIDA TROPICALIS: SIGNIFICANT CHANGE UP
-  COAGULASE NEGATIVE STAPHYLOCOCCUS: SIGNIFICANT CHANGE UP
-  K. PNEUMONIAE GROUP: SIGNIFICANT CHANGE UP
-  KPC RESISTANCE GENE: SIGNIFICANT CHANGE UP
-  STREPTOCOCCUS SP. (NOT GRP A, B OR S PNEUMONIAE): SIGNIFICANT CHANGE UP
A BAUMANNII DNA SPEC QL NAA+PROBE: SIGNIFICANT CHANGE UP
CULTURE RESULTS: SIGNIFICANT CHANGE UP
E CLOAC COMP DNA BLD POS QL NAA+PROBE: SIGNIFICANT CHANGE UP
E COLI DNA BLD POS QL NAA+NON-PROBE: SIGNIFICANT CHANGE UP
ENTEROCOC DNA BLD POS QL NAA+NON-PROBE: SIGNIFICANT CHANGE UP
ENTEROCOC DNA BLD POS QL NAA+NON-PROBE: SIGNIFICANT CHANGE UP
GP B STREP DNA BLD POS QL NAA+NON-PROBE: SIGNIFICANT CHANGE UP
HAEM INFLU DNA BLD POS QL NAA+NON-PROBE: SIGNIFICANT CHANGE UP
K OXYTOCA DNA BLD POS QL NAA+NON-PROBE: SIGNIFICANT CHANGE UP
L MONOCYTOG DNA BLD POS QL NAA+NON-PROBE: SIGNIFICANT CHANGE UP
METHOD TYPE: SIGNIFICANT CHANGE UP
MRSA SPEC QL CULT: SIGNIFICANT CHANGE UP
MSSA DNA SPEC QL NAA+PROBE: SIGNIFICANT CHANGE UP
N MEN ISLT CULT: SIGNIFICANT CHANGE UP
ORGANISM # SPEC MICROSCOPIC CNT: SIGNIFICANT CHANGE UP
ORGANISM # SPEC MICROSCOPIC CNT: SIGNIFICANT CHANGE UP
P AERUGINOSA DNA BLD POS NAA+NON-PROBE: SIGNIFICANT CHANGE UP
S MARCESCENS DNA BLD POS NAA+NON-PROBE: SIGNIFICANT CHANGE UP
S PNEUM DNA BLD POS QL NAA+NON-PROBE: SIGNIFICANT CHANGE UP
S PYO DNA BLD POS QL NAA+NON-PROBE: SIGNIFICANT CHANGE UP
SPECIMEN SOURCE: SIGNIFICANT CHANGE UP
SPECIMEN SOURCE: SIGNIFICANT CHANGE UP

## 2019-05-16 LAB — CULTURE RESULTS: SIGNIFICANT CHANGE UP

## 2020-02-20 NOTE — PROGRESS NOTE ADULT - PROBLEM SELECTOR PROBLEM 6
Sickle cell anemia Banner Transposition Flap Text: The defect edges were debeveled with a #15 scalpel blade.  Given the location of the defect and the proximity to free margins a Banner transposition flap was deemed most appropriate.  Using a sterile surgical marker, an appropriate flap drawn around the defect. The area thus outlined was incised deep to adipose tissue with a #15 scalpel blade.  The skin margins were undermined to an appropriate distance in all directions utilizing iris scissors.

## 2020-05-26 NOTE — PATIENT PROFILE ADULT - PRIMARY SOURCE OF SUPPORT/COMFORT
-- Message is from the [facility] Contact Center--    Request for Cardiac Clearance    Appointment secured? yes    Type of surgery / procedure: Upper Endoscopy with Dilation  Location of surgery / procedure: [facility]  Date of surgery / procedure: Thursday, 05/28/2020  Surgeon Name: Dr. Farhan Villalobos      Other information:    Elena(RN for Dr Villalobos) indicates, Dr Farhan Villalobos is requesting for patient to pause any blood thinners As Early As Tomorrow Saturday, 05/23/2020.     Advised Elena to please fax over CC documentation over to Card Office. Elena verbally agreed, issued Card Fax #.            Alternative phone number:   Turnaround time given to caller:   \"This message will be sent to [state Provider's name]. The doctor's assistant will fulfill your request as soon as they review your message.\"  
Anna from Dr Villalobos office is calling again for CC due to patient need to stop the medication tomorrow. Please contact to discuss Dr. Crow Stewart need a reply today.  
Note regarding holding Plavix for GI procedure to AMA to fax.   
parent

## 2020-06-02 NOTE — PATIENT PROFILE ADULT - NSPROALCOHOLUSE2_GEN_A_NUR
PRINCIPAL DISCHARGE DIAGNOSIS  Diagnosis: Weakness  Assessment and Plan of Treatment: Secondary to resolving UTI  Patient will be discharged to rehab facility  Patient should follow up with his primary care physician, Dr. Ortiz, in 1 week. PRINCIPAL DISCHARGE DIAGNOSIS  Diagnosis: Weakness  Assessment and Plan of Treatment: Secondary to resolving UTI  Patient will be discharged to rehab facility  Patient should follow up with his primary care physician, Dr. Ortiz, in 1 week.      SECONDARY DISCHARGE DIAGNOSES  Diagnosis: HTN (hypertension)  Assessment and Plan of Treatment: -   - Because of your uncontrolled blood pressure, we increased your amlodipine to 10mg QD never

## 2023-01-01 NOTE — ED PROVIDER NOTE - ENMT NEGATIVE STATEMENT, MLM
The patient is Watcher - Medium risk of patient condition declining or worsening    Shift Goals  Clinical Goals: increase PO intake, VSS, tolerate feeds  Patient Goals: NA  Family Goals: no family present, remain updated on POC    Progress made toward(s) clinical / shift goals:  continue to increase PO intake, VSS throughout shift, pt tolerating feeds, eye exam to be completed this AM.     Patient is not progressing towards the following goals:NA      Problem: Discharge Barriers -   Goal: [place]'s continuum or care needs will be met  Outcome: Progressing; continue plan of care to meet discharge requirements, pt will have eye exam this AM.      Problem: Safety  Goal: Patient will remain free from falls and accidental injury  Outcome: Progressing; pts environment remains safe, side rails in use/excess linens removed.      Problem: Knowledge Deficit - NICU  Goal: Family/caregivers will demonstrate understanding of plan of care, disease process/condition, diagnostic tests, medications and unit policies and procedures  Outcome: Progressing; Admission conference scheduled, assess knowledge by family, provide educations as needed. No family present this shift.      Problem: Discharge Barriers / Planning  Goal: Patient's continuum of care needs are met and parents/caregivers are comfortable delivering safe and appropriate care  Outcome: Progressing     Problem: Oxygenation / Respiratory Function  Goal: Patient will achieve/maintain optimum respiratory ventilation/gas exchange  Outcome: Progressing     Problem: Skin Integrity  Goal: Skin Integrity is maintained or improved  Outcome: Progressing; pt skin remains free from injury.      Problem: Hemodynamic Instability  Goal: Cardiac status will improve or remain stable  Outcome: Progressing     Problem: Nutrition / Feeding  Goal: Patient will maintain balanced nutritional intake  Outcome: Progressing; continue to increase PO intake, pt remains free from emesis.       Ears: no ear pain and no hearing problems.Nose: no nasal congestion and no nasal drainage.Mouth/Throat: no dysphagia, no hoarseness and no throat pain.Neck: no lumps, no pain, no stiffness and no swollen glands.

## 2023-01-01 NOTE — DISCHARGE NOTE NURSING/CASE MANAGEMENT/SOCIAL WORK - NSTOBACCONEVERSMOKERY/N_GEN_A
No
You can access the FollowMyHealth Patient Portal offered by [facility] by registering at the following website: [email or web address]. By joining [facility]’s FollowMyHealth portal, you will also be able to view your health information using other applications (apps) compatible with our system.

## 2023-08-31 NOTE — PATIENT PROFILE ADULT - DO YOU FEEL UNSAFE AT HOME, WORK, OR SCHOOL?
Mid-Level Procedure Text (B): After obtaining clear surgical margins the patient was sent to a mid-level provider for surgical repair.  The patient understands they will receive post-surgical care and follow-up from the mid-level provider. no

## 2024-11-04 NOTE — PATIENT PROFILE ADULT - HAVE YOU EXPERIENCED VIOLENCE OR A TRAUMATIC EVENT?
Spoke with patient's wife.  no

## 2024-12-16 NOTE — ED PROVIDER NOTE - NEUROLOGICAL, MLM
You were seen by Alan Camargo CNP.  If you have questions or concerns regarding your appointment today, you can reach out to our call center at [phone].  The following has been recommended at your appointment today:    Start using the saline nasal rinse twice daily. Add in the Budesonide to the rinse twice daily.   Continue with the Flonase 20-25 minutes after the nasal rinse. (AM)  Any nasal dryness you may use Aquaphor or Vaseline around the nostrils 2-3 times daily.   I will reach out to , ENT MD and they will call to get you scheduled.    Alert and oriented, no focal deficits, no motor or sensory deficits.